# Patient Record
Sex: FEMALE | Race: OTHER | Employment: OTHER | URBAN - METROPOLITAN AREA
[De-identification: names, ages, dates, MRNs, and addresses within clinical notes are randomized per-mention and may not be internally consistent; named-entity substitution may affect disease eponyms.]

---

## 2017-02-17 RX ORDER — EZETIMIBE 10 MG/1
10 TABLET ORAL DAILY
Qty: 30 TAB | Refills: 12 | Status: SHIPPED | OUTPATIENT
Start: 2017-02-17 | End: 2017-06-08 | Stop reason: SDUPTHER

## 2017-02-21 NOTE — TELEPHONE ENCOUNTER
Patient  called and said that [employer] will no longer cover the generic of zetia , but will call the brand name of zetia please call  to [facility]  in [place] , they will have transferred to the [place] in [place] where they are at right now.       Thanks [employer]

## 2017-02-23 RX ORDER — EZETIMIBE 10 MG/1
10 TABLET ORAL DAILY
Qty: 30 TAB | Refills: 12 | Status: SHIPPED | OUTPATIENT
Start: 2017-02-23 | End: 2017-06-08 | Stop reason: SDUPTHER

## 2017-06-01 ENCOUNTER — TELEPHONE (OUTPATIENT)
Dept: CARDIOLOGY CLINIC | Age: 72
End: 2017-06-01

## 2017-06-01 DIAGNOSIS — E78.2 MIXED HYPERLIPIDEMIA: Primary | ICD-10-CM

## 2017-06-08 ENCOUNTER — OFFICE VISIT (OUTPATIENT)
Dept: CARDIOLOGY CLINIC | Age: 72
End: 2017-06-08

## 2017-06-08 VITALS
HEART RATE: 67 BPM | RESPIRATION RATE: 16 BRPM | HEIGHT: 60 IN | BODY MASS INDEX: 30.92 KG/M2 | SYSTOLIC BLOOD PRESSURE: 118 MMHG | OXYGEN SATURATION: 97 % | WEIGHT: 157.5 LBS | DIASTOLIC BLOOD PRESSURE: 68 MMHG

## 2017-06-08 DIAGNOSIS — I10 ESSENTIAL HYPERTENSION, BENIGN: ICD-10-CM

## 2017-06-08 DIAGNOSIS — G47.33 OBSTRUCTIVE SLEEP APNEA (ADULT) (PEDIATRIC): ICD-10-CM

## 2017-06-08 DIAGNOSIS — E78.2 MIXED HYPERLIPIDEMIA: ICD-10-CM

## 2017-06-08 DIAGNOSIS — F41.9 ANXIETY: ICD-10-CM

## 2017-06-08 DIAGNOSIS — I48.0 PAROXYSMAL ATRIAL FIBRILLATION (HCC): Primary | ICD-10-CM

## 2017-06-08 RX ORDER — CLINDAMYCIN PHOSPHATE 20 MG/G
CREAM VAGINAL
COMMUNITY
Start: 2017-05-10 | End: 2019-05-09 | Stop reason: ALTCHOICE

## 2017-06-08 RX ORDER — AMLODIPINE BESYLATE 5 MG/1
TABLET ORAL
Qty: 90 TAB | Refills: 3 | Status: SHIPPED | OUTPATIENT
Start: 2017-06-08 | End: 2018-03-13 | Stop reason: SDUPTHER

## 2017-06-08 RX ORDER — EZETIMIBE 10 MG/1
10 TABLET ORAL DAILY
Qty: 90 TAB | Refills: 3 | Status: SHIPPED | OUTPATIENT
Start: 2017-06-08 | End: 2018-08-16 | Stop reason: SDUPTHER

## 2017-06-08 NOTE — PROGRESS NOTES
Note by KADY Maria:    Chief Complaint   Patient presents with    Follow-up     6 month. complains of 2/10 back pain L right side with golf ball sized lump. Patient reports not trying anything for relief.   Suggested rest and ibuprofen with ice on 15 min at a time

## 2017-06-08 NOTE — PROGRESS NOTES
NAME:  Evy Goldberg   :      MRN:   [id number]   PCP:  Raghavendra Smiley MD           Subjective: The patient is a 66y.o. year old female  who returns for a routine follow-up on HTN and hyperlipidemia. Since the last visit, patient reports no change in exercise tolerance, chest pain, edema, medication intolerance, palpitations, shortness of breath, PND/orthopnea wheezing, sputum, syncope, dizziness or light headedness. Doing well. Complains of a painful cystic mass on her right low back. Also states she has anxiety and thinks about all the world problems too much. Patient Active Problem List   Diagnosis Code    Palpitations R00.2    Tick bite W57. Neha Janine Arthritis M19.90    Fibrocystic breast changes N60.19    History of emotional problems F48.9    Mixed hyperlipidemia E78.2    Essential hypertension, benign I10    Atrial fibrillation (HCC) I48.91    Obstructive sleep apnea (adult) (pediatric) G47.33    RLQ abdominal pain R10.31    Hematuria R31.9    Anemia D64.9       Patient Active Problem List   Diagnosis Code    Palpitations R00.2    Tick bite W57. Neha Janine Arthritis M19.90    Fibrocystic breast changes N60.19    History of emotional problems F48.9    Mixed hyperlipidemia E78.2    Essential hypertension, benign I10    Atrial fibrillation (HCC) I48.91    Obstructive sleep apnea (adult) (pediatric) G47.33    RLQ abdominal pain R10.31    Hematuria R31.9    Anemia D64.9       Past Medical History:   Diagnosis Date    Anxiety     Arrhythmia     palpitations    Arthritis     Diverticulosis     Fibrocystic breast changes 2011    GERD (gastroesophageal reflux disease)     Hiatal hernia     High cholesterol     Hypertension     Migraine     MARIUSZ (obstructive sleep apnea) 2009    AHI: 15 per hour    Other ill-defined conditions     VULVA-BURNING    Unspecified sleep apnea     uses c-pap       Social History   Substance Use Topics    Smoking status: Never Smoker    Smokeless tobacco: Never Used    Alcohol use No      History reviewed. No pertinent family history. Review of Systems  Constitutional: Negative for fever, chills, and diaphoresis. Respiratory: Negative for cough, hemoptysis, sputum production, shortness of breath and wheezing. Cardiovascular: Negative for chest pain, palpitations, orthopnea, claudication, leg swelling and PND. Gastrointestinal: Negative for heartburn, nausea, vomiting, blood in stool and melena. Genitourinary: Negative for dysuria and flank pain. Musculoskeletal: Negative for joint pain and back pain. Skin: Negative for rash. Neurological: Negative for focal weakness, seizures, loss of consciousness, weakness and headaches. Endo/Heme/Allergies: Does not bruise/bleed easily. Psychiatric/Behavioral: Negative for memory loss. The patient does not have insomnia. Objective:       Vitals:    06/08/17 1136 06/08/17 1151   BP: 122/70 118/68   Pulse: 67    Resp: 16    SpO2: 97%    Weight: 157 lb 8 oz (71.4 kg)    Height: 5' (1.524 m)     Body mass index is 30.76 kg/(m^2). General PE    Gen: NAD     Mental Status - Alert. General Appearance - Not in acute distress. Neck - no JVD     Chest and Lung Exam     Inspection: Accessory muscles - No use of accessory muscles in breathing. Auscultation:   Breath sounds: - Normal.     Cardiovascular   Inspection: Jugular vein - Bilateral - Inspection Normal.   Palpation/Percussion:   Apical Impulse: - Normal.   Auscultation: Rhythm - Regular. Heart Sounds - S1 WNL and S2 WNL. No S3 or S4. Murmurs & Other Heart Sounds: Auscultation of the heart reveals - No Murmurs. Peripheral Vascular   Upper Extremity: Inspection - Bilateral - No Cyanotic nailbeds or Digital clubbing. Lower Extremity:   Palpation: Edema - Bilateral - No edema. Abdomen: Soft, non-tender, bowel sounds are active.      Neuro: A&O times 3, CN and motor grossly WNL      Data Review: EKG -  Sinus  Rhythm  -First degree A-V block   Riky = 228  BORDERLINE RHYTHM      Allergies reviewed  Allergies   Allergen Reactions    Latex Itching    Adhesive Tape-Silicones Other (comments)     Causes skin to turn red    Codeine Other (comments)     abd pain      Crestor [Rosuvastatin] Itching    Dicyclomine Other (comments)     Chest pain     Lipitor [Atorvastatin] Myalgia     Joint pain      Other Medication Rash     Itch Relief Mositurizing Lotion    Pcn [Penicillins] Hives       Medications reviewed  Current Outpatient Prescriptions   Medication Sig    ezetimibe (ZETIA) 10 mg tablet Take 1 Tab by mouth daily. TAKE ONE TABLET BY MOUTH ONCE DAILY  Indications: BRAND ONLY    pitavastatin (LIVALO) 4 mg tab tablet Take 1 Tab by mouth daily.  amLODIPine (NORVASC) 5 mg tablet Take one tab daily    cpap machine kit by Does Not Apply route.  hydroCHLOROthiazide (HYDRODIURIL) 25 mg tablet TAKE ONE TABLET BY MOUTH ONCE DAILY    metoprolol succinate (TOPROL-XL) 50 mg XL tablet TAKE ONE TABLET BY MOUTH DAILY    BIOTIN PO Take 5,000 mcg by mouth two (2) times a day.  DOCOSAHEXANOIC ACID/EPA (FISH OIL PO) Take 2,000 mg by mouth daily.  FLAXSEED OIL PO Take 1,000 mg by mouth.  aspirin delayed-release (ASPIR-LOW) 81 mg tablet Take 81 mg by mouth daily.  estradiol (ESTRACE) 1 mg tablet Take 0.5 mg by mouth every other day.  clindamycin (CLEOCIN) 2 % vaginal cream     iron-vitamin C (VITRON-C) 65 mg iron- 125 mg TbEC Take 1 tablet by mouth daily.  naproxen (NAPROSYN) 500 mg tablet Take 500 mg by mouth two (2) times daily as needed. No current facility-administered medications for this visit. Assessment:       ICD-10-CM ICD-9-CM    1. Paroxysmal atrial fibrillation (HCC) I48.0 427.31    2. Essential hypertension, benign I10 401.1 AMB POC EKG ROUTINE W/ 12 LEADS, INTER & REP   3. Mixed hyperlipidemia E78.2 272.2    4.  Anxiety F41.9 300.00         Orders Placed This Encounter  AMB POC EKG ROUTINE W/ 12 LEADS, INTER & REP     Order Specific Question:   Reason for Exam:     Answer:   Routine    clindamycin (CLEOCIN) 2 % vaginal cream    ezetimibe (ZETIA) 10 mg tablet     Sig: Take 1 Tab by mouth daily. TAKE ONE TABLET BY MOUTH ONCE DAILY  Indications: BRAND ONLY     Dispense:  90 Tab     Refill:  3    pitavastatin (LIVALO) 4 mg tab tablet     Sig: Take 1 Tab by mouth daily. Dispense:  90 Tab     Refill:  3    amLODIPine (NORVASC) 5 mg tablet     Sig: Take one tab daily     Dispense:  90 Tab     Refill:  3         Plan:     Patient presents for follow up, feeling well and stable from cardiac standpoint. PAF:  Holter (7/15) with NO afib. Low threshold to anticoagulate if any clinically apparent atrial fibrillation in the future. HTN at goal.    Lipids:  on maximally tolerated statin and Zetia. Noted sebaceous cyst in right lower back. Advised pt to monitor for one week and see PCP if pain, size progresses. Anxiety:  She states she is constantly thinking about all of the world's problems. Discussed briefly to try to focus on the things that she can change. Advised to discuss with PCP as well. We will see her back in 6 mo.      Joel Go MD

## 2017-06-08 NOTE — MR AVS SNAPSHOT
Visit Information Date & Time Provider Department Dept. Phone Encounter #  
 6/8/2017 11:30 AM Rosalva Choe, [address] [facility] [phone] Your Appointments 12/13/2017  1:30 PM  
6 MONTH with Rosalva Choe MD  
[address] [facility] [address]) Appt Note: $0CP 6/8/17ksr [id number] [facility]  
[phone] [address] [facility] Upcoming Health Maintenance Date Due DTaP/Tdap/Td series (1 - Tdap) 9/17/1964 FOBT Q 1 YEAR AGE 50-75 9/17/1993 ZOSTER VACCINE AGE 60> 9/17/2003 GLAUCOMA SCREENING Q2Y 9/17/2008 OSTEOPOROSIS SCREENING (DEXA) 9/17/2008 Pneumococcal 65+ Low/Medium Risk (1 of 2 - PCV13) 9/17/2008 MEDICARE YEARLY EXAM 9/17/2008 BREAST CANCER SCRN MAMMOGRAM 6/30/2013 INFLUENZA AGE 9 TO ADULT 8/1/2017 Allergies as of 6/8/2017  Review Complete On: 6/8/2017 By: Rosalva Choe MD  
  
 Severity Noted Reaction Type Reactions Latex  07/29/2012    Itching Adhesive Tape-silicones High 36/10/0543    Other (comments) Causes skin to turn red Codeine  05/21/2011    Other (comments)  
 abd pain Crestor [Rosuvastatin]  05/21/2011    Itching Dicyclomine  04/16/2013    Other (comments) Chest pain Lipitor [Atorvastatin]  12/08/2016    Myalgia Joint pain Other Medication  09/18/2012   Topical Rash Itch Relief Mositurizing Lotion Pcn [Penicillins]  05/21/2011    Hives Current Immunizations  Never Reviewed No immunizations on file. Not reviewed this visit You Were Diagnosed With   
  
 Codes Comments Essential hypertension, benign    -  Primary ICD-10-CM: I10 
ICD-9-CM: 401.1 Mixed hyperlipidemia     ICD-10-CM: E78.2 ICD-9-CM: 272.2 Paroxysmal atrial fibrillation (HCC)     ICD-10-CM: I48.0 ICD-9-CM: 427.31 Vitals BP Pulse Resp Height(growth percentile) Weight(growth percentile) SpO2  
 118/68 (BP 1 Location: Left arm, BP Patient Position: Sitting) 67 16 5' (1.524 m) 157 lb 8 oz (71.4 kg) 97% BMI OB Status Smoking Status 30.76 kg/m2 Hysterectomy Never Smoker Vitals History BMI and BSA Data Body Mass Index Body Surface Area 30.76 kg/m 2 1.74 m 2 Preferred Pharmacy Pharmacy Name Phone [facility] [address] [place], [place] - [address] [place] [address] Rajendra Burns [phone] Your Updated Medication List  
  
   
This list is accurate as of: 6/8/17 12:39 PM.  Always use your most recent med list. amLODIPine 5 mg tablet Commonly known as:  Claudell Hesselbach Take one tab daily ASPIR-LOW 81 mg tablet Generic drug:  aspirin delayed-release Take 81 mg by mouth daily. BIOTIN PO Take 5,000 mcg by mouth two (2) times a day. clindamycin 2 % vaginal cream  
Commonly known as:  CLEOCIN  
  
 cpap machine kit  
by Does Not Apply route. estradiol 1 mg tablet Commonly known as:  ESTRACE Take 0.5 mg by mouth every other day.  
  
 ezetimibe 10 mg tablet Commonly known as:  Yuvonne Gallop Take 1 Tab by mouth daily. TAKE ONE TABLET BY MOUTH ONCE DAILY  Indications: BRAND ONLY FISH OIL PO Take 2,000 mg by mouth daily. FLAXSEED OIL PO Take 1,000 mg by mouth. hydroCHLOROthiazide 25 mg tablet Commonly known as:  HYDRODIURIL  
TAKE ONE TABLET BY MOUTH ONCE DAILY  
  
 metoprolol succinate 50 mg XL tablet Commonly known as:  TOPROL-XL  
TAKE ONE TABLET BY MOUTH DAILY  
  
 naproxen 500 mg tablet Commonly known as:  NAPROSYN Take 500 mg by mouth two (2) times daily as needed. pitavastatin 4 mg Tab tablet Commonly known as:  LIVALO Take 1 Tab by mouth daily. VITRON-C 65 mg iron- 125 mg Tbec Generic drug:  iron,carbonyl-vitamin C Take 1 tablet by mouth daily. Prescriptions Sent to Pharmacy Refills ezetimibe (ZETIA) 10 mg tablet 3 Sig: Take 1 Tab by mouth daily. TAKE ONE TABLET BY MOUTH ONCE DAILY  Indications: BRAND ONLY Class: Normal  
 Pharmacy: [address] Ph #: [phone] Route: Oral  
 pitavastatin (LIVALO) 4 mg tab tablet 3 Sig: Take 1 Tab by mouth daily. Class: Normal  
 Pharmacy: [address] Ph #: [phone] Route: Oral  
 amLODIPine (NORVASC) 5 mg tablet 3 Sig: Take one tab daily Class: Normal  
 Pharmacy: [address] Ph #: [phone] We Performed the Following AMB POC EKG ROUTINE W/ 12 LEADS, INTER & REP [87199 CPT(R)] Introducing [facility]! Dear Be Ba: Thank you for requesting a Pensqr account. Our records indicate that you already have an active Pensqr account. You can access your account anytime at [email or web address] Did you know that you can access your hospital and ER discharge instructions at any time in Pensqr? You can also review all of your test results from your hospital stay or ER visit. Additional Information If you have questions, please visit the Frequently Asked Questions section of the Pensqr website at [email or web address] Remember, Pensqr is NOT to be used for urgent needs. For medical emergencies, dial 911. Now available from your iPhone and Android! Please provide this summary of care documentation to your next provider. Your primary care clinician is listed as Negin Dias. If you have any questions after today's visit, please call [phone].

## 2017-11-29 ENCOUNTER — HOSPITAL ENCOUNTER (OUTPATIENT)
Dept: LAB | Age: 72
Discharge: HOME | End: 2017-11-29

## 2017-12-11 ENCOUNTER — HOSPITAL ENCOUNTER (OUTPATIENT)
Dept: LAB | Age: 72
Discharge: HOME | End: 2017-12-11
Payer: MEDICARE

## 2017-12-11 PROCEDURE — 36415 COLL VENOUS BLD VENIPUNCTURE: CPT

## 2017-12-11 PROCEDURE — 80053 COMPREHEN METABOLIC PANEL: CPT

## 2017-12-11 PROCEDURE — 80061 LIPID PANEL: CPT

## 2017-12-11 PROCEDURE — 82550 ASSAY OF CK (CPK): CPT

## 2017-12-12 LAB
ALBUMIN SERPL-MCNC: 4.1 G/DL (ref 3.5–4.8)
ALBUMIN/GLOB SERPL: 1.4 {RATIO} (ref 1.2–2.2)
ALP SERPL-CCNC: 72 IU/L (ref 39–117)
ALT SERPL-CCNC: 42 IU/L (ref 0–32)
AST SERPL-CCNC: 36 IU/L (ref 0–40)
BILIRUB SERPL-MCNC: 0.4 MG/DL (ref 0–1.2)
BUN SERPL-MCNC: 17 MG/DL (ref 8–27)
BUN/CREAT SERPL: 24 (ref 12–28)
CALCIUM SERPL-MCNC: 9.6 MG/DL (ref 8.7–10.3)
CHLORIDE SERPL-SCNC: 101 MMOL/L (ref 96–106)
CHOLEST SERPL-MCNC: 176 MG/DL (ref 100–199)
CK SERPL-CCNC: 96 U/L (ref 24–173)
CO2 SERPL-SCNC: 30 MMOL/L (ref 18–29)
CREAT SERPL-MCNC: 0.71 MG/DL (ref 0.57–1)
GFR SERPLBLD CREATININE-BSD FMLA CKD-EPI: 84 ML/MIN/1.73
GFR SERPLBLD CREATININE-BSD FMLA CKD-EPI: 97 ML/MIN/1.73
GLOBULIN SER CALC-MCNC: 2.9 G/DL (ref 1.5–4.5)
GLUCOSE SERPL-MCNC: 102 MG/DL (ref 65–99)
HDLC SERPL-MCNC: 49 MG/DL
INTERPRETATION, 910389: NORMAL
LDLC SERPL CALC-MCNC: 96 MG/DL (ref 0–99)
POTASSIUM SERPL-SCNC: 3.8 MMOL/L (ref 3.5–5.2)
PROT SERPL-MCNC: 7 G/DL (ref 6–8.5)
SODIUM SERPL-SCNC: 144 MMOL/L (ref 134–144)
TRIGL SERPL-MCNC: 154 MG/DL (ref 0–149)
VLDLC SERPL CALC-MCNC: 31 MG/DL (ref 5–40)

## 2017-12-13 ENCOUNTER — OFFICE VISIT (OUTPATIENT)
Dept: CARDIOLOGY CLINIC | Age: 72
End: 2017-12-13

## 2017-12-13 VITALS
SYSTOLIC BLOOD PRESSURE: 120 MMHG | HEART RATE: 75 BPM | WEIGHT: 155.9 LBS | BODY MASS INDEX: 30.61 KG/M2 | RESPIRATION RATE: 16 BRPM | HEIGHT: 60 IN | OXYGEN SATURATION: 97 % | DIASTOLIC BLOOD PRESSURE: 76 MMHG

## 2017-12-13 DIAGNOSIS — I10 ESSENTIAL HYPERTENSION, BENIGN: ICD-10-CM

## 2017-12-13 DIAGNOSIS — I48.0 PAF (PAROXYSMAL ATRIAL FIBRILLATION) (HCC): Primary | ICD-10-CM

## 2017-12-13 DIAGNOSIS — E78.2 MIXED HYPERLIPIDEMIA: ICD-10-CM

## 2017-12-13 NOTE — PROGRESS NOTES
[id number] [phone]     Subjective:      Fritz Yuan is a 74 y.o. female is here for routine f/u. The patient denies chest pain/ shortness of breath, orthopnea, PND, LE edema, syncope, or presyncope. She is concerned about poor sleep and hair loss. She has a spell of palpitations lasting a few seconds to a couple minutes once every couple weeks.     Patient Active Problem List    Diagnosis Date Noted    PAF (paroxysmal atrial fibrillation) ([facility] [address] 2017    Anemia 2016    Hematuria 2015    RLQ abdominal pain 08/15/2012    Obstructive sleep apnea (adult) (pediatric) 2012    History of emotional problems 2012    Mixed hyperlipidemia 2012    Essential hypertension, benign 2012    Atrial fibrillation ([facility] [address] 2012    Fibrocystic breast changes 2011    Arthritis     Palpitations 2011    Tick bite 2011      Jose Hartley MD  Past Medical History:   Diagnosis Date    Anxiety     Arrhythmia     palpitations    Arthritis     Diverticulosis     Fibrocystic breast changes 2011    GERD (gastroesophageal reflux disease)     Hiatal hernia     High cholesterol     Hypertension     Migraine     MARIUSZ (obstructive sleep apnea) 2009    AHI: 15 per hour    Other ill-defined conditions(799.89)     VULVA-BURNING    Unspecified sleep apnea     uses c-pap      Past Surgical History:   Procedure Laterality Date    BREAST SURGERY PROCEDURE UNLISTED      left breast lumpectomy    HX APPENDECTOMY      HX CHOLECYSTECTOMY      HX GYN       x 3, hysterectomy    HX ORTHOPAEDIC      L knee scope    HX ORTHOPAEDIC      left foot bunionectomy    HX RECTOCELE REPAIR      HX UROLOGICAL      BLADDER TACKING     Allergies   Allergen Reactions    Latex Itching    Adhesive Tape-Silicones Other (comments)     Causes skin to turn red    Codeine Other (comments)     abd pain      Crestor [Rosuvastatin] Itching    Dicyclomine Other (comments)     Chest pain     Iodine And Iodide Containing Products Rash     Caused itching and a rash    Lipitor [Atorvastatin] Myalgia     Joint pain      Other Medication Rash     Itch Relief Mositurizing Lotion    Pcn [Penicillins] Hives      No family history on file. Social History     Social History    Marital status:      Spouse name: N/A    Number of children: N/A    Years of education: N/A     Occupational History    Not on file. Social History Main Topics    Smoking status: Never Smoker    Smokeless tobacco: Never Used    Alcohol use No    Drug use: No    Sexual activity: Yes     Partners: Male     Other Topics Concern    Not on file     Social History Narrative      Current Outpatient Prescriptions   Medication Sig    ezetimibe (ZETIA) 10 mg tablet Take 1 Tab by mouth daily. TAKE ONE TABLET BY MOUTH ONCE DAILY  Indications: BRAND ONLY    pitavastatin (LIVALO) 4 mg tab tablet Take 1 Tab by mouth daily.  amLODIPine (NORVASC) 5 mg tablet Take one tab daily    cpap machine kit by Does Not Apply route.  hydroCHLOROthiazide (HYDRODIURIL) 25 mg tablet TAKE ONE TABLET BY MOUTH ONCE DAILY    metoprolol succinate (TOPROL-XL) 50 mg XL tablet TAKE ONE TABLET BY MOUTH DAILY    iron-vitamin C (VITRON-C) 65 mg iron- 125 mg TbEC Take 1 Tab by mouth two (2) times a day.  BIOTIN PO Take 5,000 mcg by mouth two (2) times a day.  DOCOSAHEXANOIC ACID/EPA (FISH OIL PO) Take 2,000 mg by mouth daily.  FLAXSEED OIL PO Take 1,000 mg by mouth.  aspirin delayed-release (ASPIR-LOW) 81 mg tablet Take 81 mg by mouth daily.  estradiol (ESTRACE) 1 mg tablet Take 1 mg by mouth daily.  naproxen (NAPROSYN) 500 mg tablet Take 500 mg by mouth two (2) times daily as needed.  clindamycin (CLEOCIN) 2 % vaginal cream      No current facility-administered medications for this visit.           Review of Symptoms:  11 systems reviewed, negative other than as stated in the HPI    Physical ExamPhysical Exam:    Vitals:    12/13/17 1341 12/13/17 1400   BP: 124/74 120/76   Pulse: 75    Resp: 16    SpO2: 97%    Weight: 155 lb 14.4 oz (70.7 kg)    Height: 5' (1.524 m)      Body mass index is 30.45 kg/(m^2). General PE   Gen:  NAD  Mental Status - Alert. General Appearance - Not in acute distress. Chest and Lung Exam   Inspection: Accessory muscles - No use of accessory muscles in breathing. Auscultation:   Breath sounds: - Normal.   Cardiovascular   Inspection: Jugular vein - Bilateral - Inspection Normal.   Palpation/Percussion:   Apical Impulse: - Normal.   Auscultation: Rhythm - Regular. Heart Sounds - S1 WNL and S2 WNL. No S3 or S4. Murmurs & Other Heart Sounds: Auscultation of the heart reveals - No Murmurs. Peripheral Vascular   Upper Extremity: Inspection - Bilateral - No Cyanotic nailbeds or Digital clubbing. Lower Extremity:   Palpation: Edema - Bilateral - No edema. Abdomen:   Soft, non-tender, bowel sounds are active.   Neuro: A&O times 3, CN and motor grossly WNL    Labs:   Lab Results   Component Value Date/Time    Cholesterol, total 176 12/11/2017 11:43 AM    Cholesterol, total 210 12/06/2016 01:05 PM    Cholesterol, total 167 05/16/2016 12:07 PM    Cholesterol, total 165 06/25/2015 12:27 PM    Cholesterol, total 245 12/18/2014 01:43 PM    HDL Cholesterol 49 12/11/2017 11:43 AM    HDL Cholesterol 62 12/06/2016 01:05 PM    HDL Cholesterol 54 05/16/2016 12:07 PM    HDL Cholesterol 54 06/25/2015 12:27 PM    HDL Cholesterol 80 12/18/2014 01:43 PM    LDL, calculated 96 12/11/2017 11:43 AM    LDL, calculated 114 12/06/2016 01:05 PM    LDL, calculated 72 05/16/2016 12:07 PM    LDL, calculated 70 06/25/2015 12:27 PM    LDL, calculated 144 12/18/2014 01:43 PM    Triglyceride 154 12/11/2017 11:43 AM    Triglyceride 171 12/06/2016 01:05 PM    Triglyceride 206 05/16/2016 12:07 PM    Triglyceride 207 06/25/2015 12:27 PM Triglyceride 107 12/18/2014 01:43 PM     Lab Results   Component Value Date/Time     05/21/2011 01:00 PM     Lab Results   Component Value Date/Time    Sodium 144 12/11/2017 11:43 AM    Potassium 3.8 12/11/2017 11:43 AM    Chloride 101 12/11/2017 11:43 AM    CO2 30 12/11/2017 11:43 AM    Anion gap 7 09/17/2014 02:19 PM    Glucose 102 12/11/2017 11:43 AM    BUN 17 12/11/2017 11:43 AM    Creatinine 0.71 12/11/2017 11:43 AM    BUN/Creatinine ratio 24 12/11/2017 11:43 AM    GFR est AA 97 12/11/2017 11:43 AM    GFR est non-AA 84 12/11/2017 11:43 AM    Calcium 9.6 12/11/2017 11:43 AM    Bilirubin, total 0.4 12/11/2017 11:43 AM    AST (SGOT) 36 12/11/2017 11:43 AM    Alk. phosphatase 72 12/11/2017 11:43 AM    Protein, total 7.0 12/11/2017 11:43 AM    Albumin 4.1 12/11/2017 11:43 AM    Globulin 4.3 07/07/2013 01:40 PM    A-G Ratio 1.4 12/11/2017 11:43 AM    ALT (SGPT) 42 12/11/2017 11:43 AM       EKG:  NSR     Assessment:        1. PAF (paroxysmal atrial fibrillation) ([facility] [address]    2. Essential hypertension, benign    3. Mixed hyperlipidemia        Orders Placed This Encounter    AMB POC EKG ROUTINE W/ 12 LEADS, INTER & REP     Order Specific Question:   Reason for Exam:     Answer:   routine        Plan:     Patient presents for follow up, feeling well and stable from cardiac standpoint.      PAF:  Holter (7/15) with NO afib. Low threshold to anticoagulate if any clinically apparent atrial fibrillation in the future. Check 2 week event monitor due to occasional palpitations lasting seconds to minutes. These occur about once or twice a week. She states she is moving to [place] soon. Advised to consider having this done with a cardiologist in [place]. Otherwise we can do it when she comes back in April.     HTN at goal.     Lipids:  on maximally tolerated statin and Zetia. LDL in the 90s.    Anxiety:  She states she is constantly thinking about all of the world's problems.   Discussed briefly to try to focus on the things that she can change. Advised to discuss with PCP as well.       Hair loss and disturbed sleep:  She states she has already seen dermatology who said that hair loss is due to age. She has sleep apnea on CPAP, advised to discuss anxiety and sleep with PCP     Follow up in 6 months if testing normal and no progressive symptoms. Sooner PRN abnormal event monitor.     Ova Kayser, MD

## 2017-12-13 NOTE — PROGRESS NOTES
1. Have you been to the ER, urgent care clinic since your last visit? Hospitalized since your last visit? Out patient for back surgery and mammogram.      2. Have you seen or consulted any other health care providers outside of the [address] [place] [address] since your last visit? Include any pap smears or colon screening.        No.      Chief Complaint   Patient presents with    Other     6 month- heart palps

## 2017-12-13 NOTE — MR AVS SNAPSHOT
Visit Information Date & Time Provider Department Dept. Phone Encounter #  
 12/13/2017  1:30 PM Yesenia Hannah, [facility] [phone] [id number] Your Appointments 12/26/2017 10:00 AM  
Any with Patricia Jefferson MD  
[address] [place] ([facility] Appt Note: Yearly PAP f/up bring machine [address] [address]  
  
   
 [address] [place] [id number] Upcoming Health Maintenance Date Due DTaP/Tdap/Td series (1 - Tdap) 9/17/1964 FOBT Q 1 YEAR AGE 50-75 9/17/1993 ZOSTER VACCINE AGE 60> 7/17/2003 GLAUCOMA SCREENING Q2Y 9/17/2008 OSTEOPOROSIS SCREENING (DEXA) 9/17/2008 Pneumococcal 65+ Low/Medium Risk (1 of 2 - PCV13) 9/17/2008 MEDICARE YEARLY EXAM 9/17/2008 Influenza Age 5 to Adult 8/1/2017 Allergies as of 12/13/2017  Review Complete On: 12/13/2017 By: Yesenia Hannah MD  
  
 Severity Noted Reaction Type Reactions Latex  07/29/2012    Itching Adhesive Tape-silicones High 18/88/5558    Other (comments) Causes skin to turn red Codeine  05/21/2011    Other (comments)  
 abd pain Crestor [Rosuvastatin]  05/21/2011    Itching Dicyclomine  04/16/2013    Other (comments) Chest pain Iodine And Iodide Containing Products  12/13/2017    Rash Caused itching and a rash Lipitor [Atorvastatin]  12/08/2016    Myalgia Joint pain Other Medication  09/18/2012   Topical Rash Itch Relief Mositurizing Lotion Pcn [Penicillins]  05/21/2011    Hives Current Immunizations  Never Reviewed No immunizations on file. Not reviewed this visit You Were Diagnosed With   
  
 Codes Comments PAF (paroxysmal atrial fibrillation) ([facility]    -  Primary ICD-10-CM: I48.0 ICD-9-CM: 427.31 Essential hypertension, benign     ICD-10-CM: I10 
ICD-9-CM: 401.1 Mixed hyperlipidemia     ICD-10-CM: E78.2 ICD-9-CM: 272.2 Vitals BP Pulse Resp Height(growth percentile) Weight(growth percentile) SpO2  
 120/76 (BP 1 Location: Left arm, BP Patient Position: Sitting) 75 16 5' (1.524 m) 155 lb 14.4 oz (70.7 kg) 97% BMI OB Status Smoking Status 30.45 kg/m2 Hysterectomy Never Smoker Vitals History BMI and BSA Data Body Mass Index Body Surface Area  
 30.45 kg/m 2 1.73 m 2 Preferred Pharmacy Pharmacy Name Phone [facility] [address] [place], [place] - [address] [place] [address] Scotty Humphrey [phone] Your Updated Medication List  
  
   
This list is accurate as of: 12/13/17  2:27 PM.  Always use your most recent med list. amLODIPine 5 mg tablet Commonly known as:  Bacilio Chafe Take one tab daily ASPIR-LOW 81 mg tablet Generic drug:  aspirin delayed-release Take 81 mg by mouth daily. BIOTIN PO Take 5,000 mcg by mouth two (2) times a day. clindamycin 2 % vaginal cream  
Commonly known as:  CLEOCIN  
  
 cpap machine kit  
by Does Not Apply route. estradiol 1 mg tablet Commonly known as:  ESTRACE Take 1 mg by mouth daily. ezetimibe 10 mg tablet Commonly known as:  Alexandria Fleet Take 1 Tab by mouth daily. TAKE ONE TABLET BY MOUTH ONCE DAILY  Indications: BRAND ONLY FISH OIL PO Take 2,000 mg by mouth daily. FLAXSEED OIL PO Take 1,000 mg by mouth. hydroCHLOROthiazide 25 mg tablet Commonly known as:  HYDRODIURIL  
TAKE ONE TABLET BY MOUTH ONCE DAILY  
  
 metoprolol succinate 50 mg XL tablet Commonly known as:  TOPROL-XL  
TAKE ONE TABLET BY MOUTH DAILY  
  
 naproxen 500 mg tablet Commonly known as:  NAPROSYN Take 500 mg by mouth two (2) times daily as needed. pitavastatin calcium 4 mg Tab tablet Commonly known as:  LIVALO Take 1 Tab by mouth daily. VITRON-C 65 mg iron- 125 mg Tbec Generic drug:  iron,carbonyl-vitamin C Take 1 Tab by mouth two (2) times a day. We Performed the Following AMB POC EKG ROUTINE W/ 12 LEADS, INTER & REP [56120 CPT(R)] Introducing [facility] SERVICES! Dear Keysha Mendez: Thank you for requesting a [employer] account. Our records indicate that you already have an active [employer] account. You can access your account anytime at [email or web address] Did you know that you can access your hospital and ER discharge instructions at any time in [employer]? You can also review all of your test results from your hospital stay or ER visit. Additional Information If you have questions, please visit the Frequently Asked Questions section of the [employer] website at [email or web address] Remember, [employer] is NOT to be used for urgent needs. For medical emergencies, dial 911. Now available from your iPhone and Android! Please provide this summary of care documentation to your next provider. Your primary care clinician is listed as Jodeane Spatz. If you have any questions after today's visit, please call [phone].

## 2017-12-26 ENCOUNTER — OFFICE VISIT (OUTPATIENT)
Dept: SLEEP MEDICINE | Age: 72
End: 2017-12-26

## 2017-12-26 VITALS
OXYGEN SATURATION: 94 % | TEMPERATURE: 98 F | HEIGHT: 60 IN | HEART RATE: 67 BPM | WEIGHT: 152 LBS | BODY MASS INDEX: 29.84 KG/M2 | DIASTOLIC BLOOD PRESSURE: 77 MMHG | SYSTOLIC BLOOD PRESSURE: 152 MMHG

## 2017-12-26 DIAGNOSIS — I10 ESSENTIAL HYPERTENSION, BENIGN: ICD-10-CM

## 2017-12-26 DIAGNOSIS — G47.33 OSA (OBSTRUCTIVE SLEEP APNEA): Primary | ICD-10-CM

## 2017-12-26 NOTE — PATIENT INSTRUCTIONS
217 [facility] [place]. [place], [address]  Tel.  [phone]  Fax. 100 [facility] [place] 60  [place], [address] [facility]  Tel.  [phone]  Fax. [phone] [address] [facility] [address]  Tel.  [phone]  Fax. [phone]     Learning About CPAP for Sleep Apnea  What is CPAP? CPAP is a small machine that you use at home every night while you sleep. It increases air pressure in your throat to keep your airway open. When you have sleep apnea, this can help you sleep better so you feel much better. CPAP stands for \"continuous positive airway pressure. \"  The CPAP machine will have one of the following:  · A mask that covers your nose and mouth  · Prongs that fit into your nose  · A mask that covers your nose only, the most common type. This type is called NCPAP. The N stands for \"nasal.\"  Why is it done? CPAP is usually the best treatment for obstructive sleep apnea. It is the first treatment choice and the most widely used. Your doctor may suggest CPAP if you have:  · Moderate to severe sleep apnea. · Sleep apnea and coronary artery disease (CAD) or heart failure. How does it help? · CPAP can help you have more normal sleep, so you feel less sleepy and more alert during the daytime. · CPAP may help keep heart failure or other heart problems from getting worse. · NCPAP may help lower your blood pressure. · If you use CPAP, your bed partner may also sleep better because you are not snoring or restless. What are the side effects? Some people who use CPAP have:  · A dry or stuffy nose and a sore throat. · Irritated skin on the face. · Sore eyes. · Bloating. If you have any of these problems, work with your doctor to fix them. Here are some things you can try:  · Be sure the mask or nasal prongs fit well. · See if your doctor can adjust the pressure of your CPAP. · If your nose is dry, try a humidifier.   · If your nose is runny or stuffy, try decongestant medicine or a steroid nasal spray. If these things do not help, you might try a different type of machine. Some machines have air pressure that adjusts on its own. Others have air pressures that are different when you breathe in than when you breathe out. This may reduce discomfort caused by too much pressure in your nose. Where can you learn more? Go to Knowledgestreem.be  Enter PixSense in the search box to learn more about \"Learning About CPAP for Sleep Apnea. \"   © 3329-2899 Healthwise, Tranz[employer]. Care instructions adapted under license by James Elizondo (which disclaims liability or warranty for this information). This care instruction is for use with your licensed healthcare professional. If you have questions about a medical condition or this instruction, always ask your healthcare professional. [address] any warranty or liability for your use of this information. Content Version: 4.3.75619; Last Revised: January 11, 2010  PROPER SLEEP HYGIENE    What to avoid  · Do not have drinks with caffeine, such as coffee or black tea, for 8 hours before bed. · Do not smoke or use other types of tobacco near bedtime. Nicotine is a stimulant and can keep you awake. · Avoid drinking alcohol late in the evening, because it can cause you to wake in the middle of the night. · Do not eat a big meal close to bedtime. If you are hungry, eat a light snack. · Do not drink a lot of water close to bedtime, because the need to urinate may wake you up during the night. · Do not read or watch TV in bed. Use the bed only for sleeping and sexual activity. What to try  · Go to bed at the same time every night, and wake up at the same time every morning. Do not take naps during the day. · Keep your bedroom quiet, dark, and cool. · Get regular exercise, but not within 3 to 4 hours of your bedtime. .  · Sleep on a comfortable pillow and mattress.   · If watching the clock makes you anxious, turn it facing away from you so you cannot see the time. · If you worry when you lie down, start a worry book. Well before bedtime, write down your worries, and then set the book and your concerns aside. · Try meditation or other relaxation techniques before you go to bed. · If you cannot fall asleep, get up and go to another room until you feel sleepy. Do something relaxing. Repeat your bedtime routine before you go to bed again. · Make your house quiet and calm about an hour before bedtime. Turn down the lights, turn off the TV, log off the computer, and turn down the volume on music. This can help you relax after a busy day. Drowsy Driving: The [facility] 54 cites drowsiness as a causing factor in more than 001,223 police reported crashes annually, resulting in 76,000 injuries and 1,500 deaths. Other surveys suggest 55% of people polled have driven while drowsy in the past year, 23% had fallen asleep but not crashed, 3% crashed, and 2% had and accident due to drowsy driving. Who is at risk? Young Drivers: One study of drowsy driving accidents states that 55% of the drivers were under 25 years. Of those, 75% were male. Shift Workers and Travelers: People who work overnight or travel across time zones frequently are at higher risk of experiencing Circadian Rhythm Disorders. They are trying to work and function when their body is programed to sleep. Sleep Deprived: Lack of sleep has a serious impact on your ability to pay attention or focus on a task. Consistently getting less than the average of 8 hours your body needs creates partial or cumulative sleep deprivation. Untreated Sleep Disorders: Sleep Apnea, Narcolepsy, R.L.S., and other sleep disorders (untreated) prevent a person from getting enough restful sleep. This leads to excessive daytime sleepiness and increases the risk for drowsy driving accidents by up to 7 times.   Medications / Alcohol: Even over the counter medications can cause drowsiness. Medications that impair a drivers attention should have a warning label. Alcohol naturally makes you sleepy and on its own can cause accidents. Combined with excessive drowsiness its effects are amplified. Signs of Drowsy Driving:   * You don't remember driving the last few miles   * You may drift out of your arnold   * You are unable to focus and your thoughts wander   * You may yawn more often than normal   * You have difficulty keeping your eyes open / nodding off   * Missing traffic signs, speeding, or tailgating  Prevention-   Good sleep hygiene, lifestyle and behavioral choices have the most impact on drowsy driving. There is no substitute for sleep and the average person requires 8 hours nightly. If you find yourself driving drowsy, stop and sleep. Consider the sleep hygiene tips provided during your visit as well. Medication Refill Policy: Refills for all medications require 1 week advance notice. Please have your pharmacy fax a refill request. We are unable to fax, or call in \"controled substance\" medications and you will need to pick these prescriptions up from our office. [employer] Activation    Thank you for requesting access to [employer]. Please follow the instructions below to securely access and download your online medical record. [employer] allows you to send messages to your doctor, view your test results, renew your prescriptions, schedule appointments, and more. How Do I Sign Up? 1. In your internet browser, go to [email or web address]. 2. Click on the First Time User? Click Here link in the Sign In box. You will see the New Member Sign Up page. 3. Enter your [employer] Access Code exactly as it appears below. You will not need to use this code after youve completed the sign-up process. If you do not sign up before the expiration date, you must request a new code. [employer] Access Code:  Activation code not generated  Current [employer] Status: Active (This is the date your [employer] access code will )    4. Enter the last four digits of your Social Security Number (xxxx) and Date of Birth (mm/dd/yyyy) as indicated and click Submit. You will be taken to the next sign-up page. 5. Create a Rec[employer]t ID. This will be your [employer] login ID and cannot be changed, so think of one that is secure and easy to remember. 6. Create a [employer] password. You can change your password at any time. 7. Enter your Password Reset Question and Answer. This can be used at a later time if you forget your password. 8. Enter your e-mail address. You will receive e-mail notification when new information is available in [address] 9. Click Sign Up. You can now view and download portions of your medical record. 10. Click the Download Summary menu link to download a portable copy of your medical information. Additional Information    If you have questions, please call [phone]. Remember, [employer] is NOT to be used for urgent needs. For medical emergencies, dial 911.

## 2017-12-26 NOTE — PROGRESS NOTES
Patient describes nasal dryness and nose bleeds. She doesn't remember how to adjust humidity. Patient is instructed on how to adjust hose temperature and humidity. She is able to demonstrate understanding and will contact the sleep center if she has questions or concerns.

## 2017-12-26 NOTE — PROGRESS NOTES
217 [facility]. [place], [facility]  Tel.  [phone]  Fax. 100 [facility] 60  [place], [address] [facility]  Tel.  [phone]  Fax. [phone] [address]   Tel.  [phone]  Fax. [phone]     S>Ada FRANKLIN Torin Gil is a 74 y.o. female seen for a positive airway pressure follow-up. She reports no problems using the device. She is 90% compliant over the past 30 days. The following problems are identified:    Drowsiness no Problems exhaling no   Snoring no Forget to put on no   Mask Comfortable no Can't fall asleep no   Dry Mouth no Mask falls off no   Air Leaking no Frequent awakenings no         She admits that her sleep has improved. She has been evaluated by Dr. Saba Nava but opted not to get the [employer] due to cost - she does agree to visit another provider when she returns from [place] this spring. Allergies   Allergen Reactions    Latex Itching    Adhesive Tape-Silicones Other (comments)     Causes skin to turn red    Codeine Other (comments)     abd pain      Crestor [Rosuvastatin] Itching    Dicyclomine Other (comments)     Chest pain     Iodine And Iodide Containing Products Rash     Caused itching and a rash    Lipitor [Atorvastatin] Myalgia     Joint pain      Other Medication Rash     Itch Relief Mositurizing Lotion    Pcn [Penicillins] Hives       She has a current medication list which includes the following prescription(s): clindamycin, ezetimibe, pitavastatin calcium, amlodipine, cpap machine, hydrochlorothiazide, metoprolol succinate, iron,carbonyl-vitamin c, biotin, docosahexanoic acid/epa, flaxseed oil, aspirin delayed-release, estradiol, and naproxen. Mabeline Sink She  has a past medical history of Anxiety; Arrhythmia; Arthritis; Diverticulosis; Fibrocystic breast changes (7/11/2011); GERD (gastroesophageal reflux disease); Hiatal hernia;  High cholesterol; Hypertension; Migraine; MARIUSZ (obstructive sleep apnea) (04-); Other ill-defined conditions(799.89); and Unspecified sleep apnea. [place] Sleepiness Score: 2   and Modified F.O.S.Q. Score Total / 2: 20   which reflect improved sleep quality over therapy time. O>    Visit Vitals    /77    Pulse 67    Temp 98 °F (36.7 °C) (Oral)    Ht 5' (1.524 m)    Wt 152 lb (68.9 kg)    SpO2 94%    BMI 29.69 kg/m2         General:   Not in acute distress   Eyes:  Anicteric sclerae, no obvious strabismus   Nose:  No obvious nasal septum deviation    Oropharynx:   Class 4 oropharyngeal outlet, thick tongue base, uvula not seen due to low-lying soft palate, narrow tonsilo-pharyngeal pilars   Tonsils:   tonsils are not visualized due to low-lying soft palate   Neck:   midline trachea   Chest/Lungs:  Equal lung expansion, clear on auscultation    CVS:  Normal rate, regular rhythm; no JVD   Skin:  Warm to touch; no obvious rashes   Neuro:  No focal deficits ; no obvious tremor    Psych:  Normal affect,  normal countenance;           A>    ICD-10-CM ICD-9-CM    1. MARIUSZ (obstructive sleep apnea) G47.33 327.23 AMB SUPPLY ORDER   2. Essential hypertension, benign I10 401.1    3. BMI 29.0-29.9,adult Z68.29 V85.25      AHI = 15. On CPAP : 6 cmH2O. Compliant:      yes    Therapeutic Response:  Positive    P>    * We have recommended a dedicated weight loss through appropriate diet and an exercise regiment as significant weight reduction has been shown to reduce severity of obstructive sleep apnea. * Follow-up Disposition:  Return in about 1 year (around 12/26/2018), or if symptoms worsen or fail to improve. * She was asked to contact our office for any problems regarding PAP therapy. * Counseling was provided regarding the importance of regular PAP use and on proper sleep hygiene and safe driving. * Re-enforced proper and regular cleaning for the device. Thank you for allowing us to participate in your patient's medical care.       Jay Dutta MD, JACQUI  Electronically signed.  12/26/17

## 2017-12-27 ENCOUNTER — DOCUMENTATION ONLY (OUTPATIENT)
Dept: SLEEP MEDICINE | Age: 72
End: 2017-12-27

## 2018-02-05 ENCOUNTER — TELEPHONE (OUTPATIENT)
Dept: CARDIOLOGY CLINIC | Age: 73
End: 2018-02-05

## 2018-02-05 NOTE — TELEPHONE ENCOUNTER
Pt called and advised she is completley out of her blood pressure medicine (Metoprolol). She just realized there were no refills. She's in [place]. She's wanting us to fax a refill request over the the [facility] pharmacy. [address]  Fax [phone]. Please call pt to advise.      Thanks  María Marinelli

## 2018-02-06 RX ORDER — METOPROLOL SUCCINATE 50 MG/1
TABLET, EXTENDED RELEASE ORAL
Qty: 90 TAB | Refills: 1 | Status: SHIPPED | OUTPATIENT
Start: 2018-02-06 | End: 2018-12-04 | Stop reason: SDUPTHER

## 2018-02-06 RX ORDER — METOPROLOL SUCCINATE 50 MG/1
TABLET, EXTENDED RELEASE ORAL
Qty: 90 TAB | Refills: 0 | Status: SHIPPED | OUTPATIENT
Start: 2018-02-06 | End: 2018-02-06 | Stop reason: SDUPTHER

## 2018-02-06 NOTE — TELEPHONE ENCOUNTER
called and advised she is vanley out of her blood pressure medicine (Metoprolol). She just realized there were no refills. She's in [place].  She's wanting us to fax a refill request over the the [facility]  Fax [phone].     Please call pt to advise.     Last visit 12/13/17

## 2018-03-13 RX ORDER — HYDROCHLOROTHIAZIDE 25 MG/1
TABLET ORAL
Qty: 90 TAB | Refills: 3 | Status: SHIPPED | OUTPATIENT
Start: 2018-03-13 | End: 2019-01-04 | Stop reason: SDUPTHER

## 2018-03-13 RX ORDER — AMLODIPINE BESYLATE 5 MG/1
TABLET ORAL
Qty: 90 TAB | Refills: 3 | Status: SHIPPED | OUTPATIENT
Start: 2018-03-13 | End: 2019-01-04 | Stop reason: SDUPTHER

## 2018-04-03 RX ORDER — EZETIMIBE 10 MG
TABLET ORAL
Qty: 30 TAB | Refills: 12 | Status: SHIPPED | OUTPATIENT
Start: 2018-04-03 | End: 2018-04-20 | Stop reason: SDUPTHER

## 2018-04-20 ENCOUNTER — OFFICE VISIT (OUTPATIENT)
Dept: CARDIOLOGY CLINIC | Age: 73
End: 2018-04-20

## 2018-04-20 VITALS
BODY MASS INDEX: 31.77 KG/M2 | DIASTOLIC BLOOD PRESSURE: 80 MMHG | HEART RATE: 80 BPM | WEIGHT: 161.8 LBS | HEIGHT: 60 IN | OXYGEN SATURATION: 98 % | SYSTOLIC BLOOD PRESSURE: 128 MMHG | RESPIRATION RATE: 18 BRPM

## 2018-04-20 DIAGNOSIS — E78.2 MIXED HYPERLIPIDEMIA: ICD-10-CM

## 2018-04-20 DIAGNOSIS — I49.9 IRREGULAR HEARTBEAT: ICD-10-CM

## 2018-04-20 DIAGNOSIS — I10 ESSENTIAL HYPERTENSION, BENIGN: ICD-10-CM

## 2018-04-20 DIAGNOSIS — I48.0 PAF (PAROXYSMAL ATRIAL FIBRILLATION) (HCC): Primary | ICD-10-CM

## 2018-04-20 NOTE — PROGRESS NOTES
Bassem Herbert DNP, ANP-BC  Subjective/HPI:     Eva Vargas is a 74 y.o. female is here for routine f/u. The patient denies exertional chest pain/ shortness of breath, orthopnea, PND, LE edema, palpitations, syncope, presyncope or fatigue. While in [place] in February patient reports she developed pain in her chest radiating through to her back, was evaluated in the emergency room and determine it was musculoskeletal in nature. Since then she has denied any other cardiac related symptoms. She endorses that she walks 5 miles a day without difficulty as well as remains extremely active cleaning her home and her usual activities of daily life. She denies persistent fluttering palpitations, however admits an occasional palpitation from time to time. History of statin intolerance however tolerating Livalo without malaise or arthralgias. Recently had nocturnal SOB and noted high BP.         PCP Provider  Julio Cesar Austin MD  Past Medical History:   Diagnosis Date    Anxiety     Arrhythmia     palpitations    Arthritis     Diverticulosis     Fibrocystic breast changes 2011    GERD (gastroesophageal reflux disease)     Hiatal hernia     High cholesterol     Hypertension     Migraine     MARIUSZ (obstructive sleep apnea) 2009    AHI: 15 per hour    Other ill-defined conditions(799.89)     VULVA-BURNING    Unspecified sleep apnea     uses c-pap      Past Surgical History:   Procedure Laterality Date    BREAST SURGERY PROCEDURE UNLISTED      left breast lumpectomy    HX APPENDECTOMY      HX CHOLECYSTECTOMY      HX GYN       x 3, hysterectomy    HX ORTHOPAEDIC      L knee scope    HX ORTHOPAEDIC      left foot bunionectomy    HX RECTOCELE REPAIR      HX UROLOGICAL      BLADDER TACKING     Allergies   Allergen Reactions    Latex Itching    Adhesive Tape-Silicones Other (comments)     Causes skin to turn red    Codeine Other (comments)     abd pain      Crestor [Rosuvastatin] Itching    Dicyclomine Other (comments)     Chest pain     Iodine And Iodide Containing Products Rash     Caused itching and a rash    Lipitor [Atorvastatin] Myalgia     Joint pain      Other Medication Rash     Itch Relief Mositurizing Lotion    Pcn [Penicillins] Hives      History reviewed. No pertinent family history. Current Outpatient Prescriptions   Medication Sig    hydroCHLOROthiazide (HYDRODIURIL) 25 mg tablet TAKE ONE TABLET BY MOUTH ONCE DAILY    amLODIPine (NORVASC) 5 mg tablet TAKE ONE TABLET BY MOUTH ONCE DAILY    metoprolol succinate (TOPROL-XL) 50 mg XL tablet TAKE ONE TABLET BY MOUTH DAILY    ezetimibe (ZETIA) 10 mg tablet Take 1 Tab by mouth daily. TAKE ONE TABLET BY MOUTH ONCE DAILY  Indications: BRAND ONLY    pitavastatin (LIVALO) 4 mg tab tablet Take 1 Tab by mouth daily.  cpap machine kit by Does Not Apply route.  BIOTIN PO Take 5,000 mcg by mouth daily.  FLAXSEED OIL PO Take 1,000 mg by mouth daily.  aspirin delayed-release (ASPIR-LOW) 81 mg tablet Take 81 mg by mouth daily.  estradiol (ESTRACE) 1 mg tablet Take 1 mg by mouth daily.  naproxen (NAPROSYN) 500 mg tablet Take 500 mg by mouth two (2) times daily as needed.  clindamycin (CLEOCIN) 2 % vaginal cream     iron-vitamin C (VITRON-C) 65 mg iron- 125 mg TbEC Take 1 Tab by mouth two (2) times a day.  DOCOSAHEXANOIC ACID/EPA (FISH OIL PO) Take 2,000 mg by mouth daily. No current facility-administered medications for this visit. Vitals:    04/20/18 0943 04/20/18 0952   BP: 120/70 128/80   Pulse: 80    Resp: 18    SpO2: 98%    Weight: 161 lb 12.8 oz (73.4 kg)    Height: 5' (1.524 m)      Social History     Social History    Marital status:      Spouse name: N/A    Number of children: N/A    Years of education: N/A     Occupational History    Not on file.      Social History Main Topics    Smoking status: Never Smoker    Smokeless tobacco: Never Used    Alcohol use No    Drug use: No    Sexual activity: Yes     Partners: Male     Other Topics Concern    Not on file     Social History Narrative       I have reviewed the nurses notes, vitals, problem list, allergy list, medical history, family, social history and medications. Review of Symptoms:    General: Pt denies excessive weight gain or loss. Pt is able to conduct ADL's  HEENT: Denies blurred vision, headaches, epistaxis and difficulty swallowing. Respiratory: Denies shortness of breath, WILLIS, wheezing or stridor. Cardiovascular: Denies precordial pain, palpitations, edema or PND  Gastrointestinal: Denies poor appetite, indigestion, abdominal pain or blood in stool  Musculoskeletal: Denies pain or swelling from muscles or joints  Neurologic: Denies tremor, paresthesias, or sensory motor disturbance  Skin: Denies rash, itching or texture change. Physical Exam:      General: Well developed, in no acute distress, cooperative and alert  HEENT: No carotid bruits, no JVD, trach is midline. Neck Supple, PEERL, EOM intact. Heart:  Normal S1/S2 negative S3 or S4. Regular, no murmur, gallop or rub.   Respiratory: Clear bilaterally x 4, no wheezing or rales  Abdomen:   Soft, non-tender, no masses, bowel sounds are active.   Extremities:  No edema, normal cap refill, no cyanosis, atraumatic. Neuro: A&Ox3, speech clear, gait stable. Skin: Skin color is normal. No rashes or lesions.  Non diaphoretic  Vascular: 2+ pulses symmetric in all extremities    Cardiographics    ECG: Normal sinus rhythm  Results for orders placed or performed in visit on 07/13/15   CARDIAC HOLTER MONITOR, 24 HOURS    Narrative    ECG Monitor/24 hours, Complete    Reason for Holter Monitor   A-fib    Heartbeat    Slowest 57  Average 69  Fastest  89    Results:   Underlying Rhythm: Normal sinus rhythm      Atrial Arrhythmias: none            AV Conduction: normal    Ventricular Arrhythmias: premature ventricular contractions;  occasional     ST Segment Analysis:non-specific changes     Symptom Correlation:  No symptoms reported. Comments:  No evidence of atrial fibrillation. Radha Grant MD         Results for orders placed or performed during the hospital encounter of 08/31/12   EKG, 12 LEAD, INITIAL   Result Value Ref Range    Ventricular Rate 57 BPM    Atrial Rate 57 BPM    P-R Interval 190 ms    QRS Duration 86 ms    Q-T Interval 412 ms    QTC Calculation (Bezet) 401 ms    Calculated P Axis 25 degrees    Calculated R Axis 38 degrees    Calculated T Axis 44 degrees    Diagnosis       Sinus bradycardia  When compared with ECG of 21-MAY-2011 12:31,  No significant change was found  Confirmed by Madhu Tom M.D., Ruel Perez ([id number]) on 8/31/2012 5:22:59 PM         Cardiology Labs:  Lab Results   Component Value Date/Time    Cholesterol, total 176 12/11/2017 11:43 AM    HDL Cholesterol 49 12/11/2017 11:43 AM    LDL, calculated 96 12/11/2017 11:43 AM    Triglyceride 154 (H) 12/11/2017 11:43 AM       Lab Results   Component Value Date/Time    Sodium 144 12/11/2017 11:43 AM    Potassium 3.8 12/11/2017 11:43 AM    Chloride 101 12/11/2017 11:43 AM    CO2 30 (H) 12/11/2017 11:43 AM    Anion gap 7 09/17/2014 02:19 PM    Glucose 102 (H) 12/11/2017 11:43 AM    BUN 17 12/11/2017 11:43 AM    Creatinine 0.71 12/11/2017 11:43 AM    BUN/Creatinine ratio 24 12/11/2017 11:43 AM    GFR est AA 97 12/11/2017 11:43 AM    GFR est non-AA 84 12/11/2017 11:43 AM    Calcium 9.6 12/11/2017 11:43 AM    Bilirubin, total 0.4 12/11/2017 11:43 AM    AST (SGOT) 36 12/11/2017 11:43 AM    Alk. phosphatase 72 12/11/2017 11:43 AM    Protein, total 7.0 12/11/2017 11:43 AM    Albumin 4.1 12/11/2017 11:43 AM    Globulin 4.3 (H) 07/07/2013 01:40 PM    A-G Ratio 1.4 12/11/2017 11:43 AM    ALT (SGPT) 42 (H) 12/11/2017 11:43 AM           Assessment:     Assessment:     Diagnoses and all orders for this visit:    1.  PAF (paroxysmal atrial fibrillation) ([facility] [address]  -     LOOP MONITOR, Clinic Performed; Future    2. Irregular heartbeat  -     AMB POC EKG ROUTINE W/ 12 LEADS, INTER & REP  -     LOOP MONITOR, Clinic Performed; Future    3. Essential hypertension, benign  -     LOOP MONITOR, Clinic Performed; Future    4. Mixed hyperlipidemia  -     LOOP MONITOR, Clinic Performed; Future        ICD-10-CM ICD-9-CM    1. PAF (paroxysmal atrial fibrillation) (HCC) I48.0 427.31 LOOP MONITOR   2. Irregular heartbeat I49.9 427.9 AMB POC EKG ROUTINE W/ 12 LEADS, INTER & REP      LOOP MONITOR   3. Essential hypertension, benign I10 401.1 LOOP MONITOR   4. Mixed hyperlipidemia E78.2 272.2 LOOP MONITOR     Orders Placed This Encounter    LOOP MONITOR, Clinic Performed     Standing Status:   Future     Standing Expiration Date:   10/20/2018     Order Specific Question:   Reason for Exam:     Answer:   palpitations    AMB POC EKG ROUTINE W/ 12 LEADS, INTER & REP     Order Specific Question:   Reason for Exam:     Answer:   routine        Plan:     PAF:  Holter (7/15) with NO afib.  Low threshold to anticoagulate if any clinically apparent atrial fibrillation in the future. Patient is now back in a [facility], will arrange for 2 week event monitor.      HTN at goal.      Lipids:  on maximally tolerated statin and Zetia. LDL in the 90s. LDL 96 12/17    Hypertension: Controlled continue current medications. Follow-up in 12 months if no AF. Renuka Eldridge MD    This note was created using voice recognition software. Despite editing, there may be syntax errors.

## 2018-04-20 NOTE — PROGRESS NOTES
1. Have you been to the ER, urgent care clinic since your last visit? Hospitalized since your last visit? ER in [place] for CP in Feb 2018    2. Have you seen or consulted any other health care providers outside of the [facility] since your last visit? Include any pap smears or colon screening. No    Chief Complaint   Patient presents with    Irregular Heart Beat     4 mo appt. Denied cardiac symptoms.

## 2018-04-20 NOTE — MR AVS SNAPSHOT
[address] [facility] 
[phone] Patient: Hector Husain MRN: [id number] VYC:[id number] Visit Information Date & Time Provider Department Dept. Phone Encounter #  
 4/20/2018  9:30 AM Breana Jeronimo, [address] [facility] [phone] [id number] Follow-up Instructions Return in about 1 year (around 4/20/2019). Follow-up and Disposition History Your Appointments 4/27/2018 10:30 AM  
PROCEDURE with [facility]) Appt Note: event monitor per Dr. Juana Barrera [facility]  
[phone] [address] [facility]  
  
    
 4/27/2018 11:00 AM  
Any with Sharif Nguyen MD  
[address] [place] [address] ([facility]) Appt Note: [facility] [address] [phone]  
[place] [phone]  
  
    
 5/9/2019 10:15 AM  
ESTABLISHED PATIENT with Breana Jeronimo MD  
[facility]) Appt Note: annual f/u  
 [id number] [facility]  
[phone] [address] [facility] Upcoming Health Maintenance Date Due DTaP/Tdap/Td series (1 - Tdap) 9/17/1964 FOBT Q 1 YEAR AGE 50-75 9/17/1993 ZOSTER VACCINE AGE 60> 7/17/2003 GLAUCOMA SCREENING Q2Y 9/17/2008 Bone Densitometry (Dexa) Screening 9/17/2008 Pneumococcal 65+ Low/Medium Risk (1 of 2 - PCV13) 9/17/2008 BREAST CANCER SCRN MAMMOGRAM 6/30/2013 Influenza Age 5 to Adult 8/1/2017 MEDICARE YEARLY EXAM 3/14/2018 Allergies as of 4/20/2018  Review Complete On: 4/20/2018 By: Breana Jeronimo MD  
  
 Severity Noted Reaction Type Reactions Latex  07/29/2012    Itching Adhesive Tape-silicones High 13/22/1572    Other (comments) Causes skin to turn red Codeine  05/21/2011    Other (comments)  
 abd pain Crestor [Rosuvastatin]  05/21/2011    Itching Dicyclomine  04/16/2013    Other (comments) Chest pain Iodine And Iodide Containing Products  12/13/2017    Rash Caused itching and a rash Lipitor [Atorvastatin]  12/08/2016    Myalgia Joint pain Other Medication  09/18/2012   Topical Rash Itch Relief Mositurizing Lotion Pcn [Penicillins]  05/21/2011    Hives Current Immunizations  Never Reviewed No immunizations on file. Not reviewed this visit You Were Diagnosed With   
  
 Codes Comments PA[facility]ox[facility]) [facility]    -  Primary ICD-10-CM: I48.0 ICD-9-CM: 427.31 Irregular heartbeat     ICD-10-CM: I49.9 ICD-9-CM: 427.9 Essential hypertension, benign     ICD-10-CM: I10 
ICD-9-CM: 401.1 Mixed hyperlipidemia     ICD-10-CM: E78.2 ICD-9-CM: 272.2 Vitals BP Pulse Resp Height(growth percentile) Weight(growth percentile) SpO2  
 128/80 (BP 1 Location: Right arm, BP Patient Position: Sitting) 80 18 5' (1.524 m) 161 lb 12.8 oz (73.4 kg) 98% BMI OB Status Smoking Status 31.6 kg/m2 Hysterectomy Never Smoker Vitals History BMI and BSA Data Body Mass Index Body Surface Area  
 31.6 kg/m 2 1.76 m 2 Preferred Pharmacy Pharmacy Name Phone [facility] [address], [address] Your Updated Medication List  
  
   
This list is accurate as of 4/20/18 10:36 AM.  Always use your most recent med list. amLODIPine 5 mg tablet Commonly known as:  Sundra [place] TAKE ONE TABLET BY MOUTH ONCE DAILY  
  
 ASPIR-LOW 81 mg tablet Generic drug:  aspirin delayed-release Take 81 mg by mouth daily. BIOTIN PO Take 5,000 mcg by mouth daily.   
  
 clindamycin 2 % vaginal cream  
Commonly known as:  CLEOCIN  
  
 cpap machine kit  
by Does Not Apply route. estradiol 1 mg tablet Commonly known as:  ESTRACE Take 1 mg by mouth daily. ezetimibe 10 mg tablet Commonly known as:  Raul Cresson Take 1 Tab by mouth daily. TAKE ONE TABLET BY MOUTH ONCE DAILY  Indications: BRAND ONLY FISH OIL PO Take 2,000 mg by mouth daily. FLAXSEED OIL PO Take 1,000 mg by mouth daily. hydroCHLOROthiazide 25 mg tablet Commonly known as:  HYDRODIURIL  
TAKE ONE TABLET BY MOUTH ONCE DAILY  
  
 metoprolol succinate 50 mg XL tablet Commonly known as:  TOPROL-XL  
TAKE ONE TABLET BY MOUTH DAILY  
  
 naproxen 500 mg tablet Commonly known as:  NAPROSYN Take 500 mg by mouth two (2) times daily as needed. pitavastatin calcium 4 mg Tab tablet Commonly known as:  LIVALO Take 1 Tab by mouth daily. VITRON-C 65 mg iron- 125 mg Tbec Generic drug:  iron,carbonyl-vitamin C Take 1 Tab by mouth two (2) times a day. We Performed the Following AMB POC EKG ROUTINE W/ 12 LEADS, INTER & REP [73041 CPT(R)] Follow-up Instructions Return in about 1 year (around 4/20/2019). To-Do List   
 04/23/2018 Cardiac Services:  LOOP MONITOR Introducing [facility]! Dear Henri Wang: Thank you for requesting a [employer] account. Our records indicate that you have previously registered for a [employer] account but its currently inactive. Please call our [employer] support line at [phone]. Additional Information If you have questions, please visit the Frequently Asked Questions section of the [employer] website at [email or web address] Remember, [employer] is NOT to be used for urgent needs. For medical emergencies, dial 911. Now available from your iPhone and Android! Please provide this summary of care documentation to your next provider. Your primary care clinician is listed as Mansi Shannon.  If you have any questions after today's visit, please call [phone].

## 2018-04-30 ENCOUNTER — CLINICAL SUPPORT (OUTPATIENT)
Dept: CARDIOLOGY CLINIC | Age: 73
End: 2018-04-30

## 2018-04-30 DIAGNOSIS — I49.9 IRREGULAR HEARTBEAT: ICD-10-CM

## 2018-04-30 DIAGNOSIS — I48.0 PAF (PAROXYSMAL ATRIAL FIBRILLATION) (HCC): ICD-10-CM

## 2018-04-30 DIAGNOSIS — I10 ESSENTIAL HYPERTENSION, BENIGN: ICD-10-CM

## 2018-04-30 DIAGNOSIS — E78.2 MIXED HYPERLIPIDEMIA: ICD-10-CM

## 2018-05-12 ENCOUNTER — HOSPITAL ENCOUNTER (EMERGENCY)
Age: 73
Discharge: HOME | End: 2018-05-12
Attending: EMERGENCY MEDICINE | Admitting: EMERGENCY MEDICINE
Payer: MEDICARE

## 2018-05-12 VITALS
SYSTOLIC BLOOD PRESSURE: 120 MMHG | RESPIRATION RATE: 18 BRPM | DIASTOLIC BLOOD PRESSURE: 64 MMHG | OXYGEN SATURATION: 98 % | BODY MASS INDEX: 30.96 KG/M2 | TEMPERATURE: 98.1 F | HEART RATE: 74 BPM | WEIGHT: 158.51 LBS

## 2018-05-12 DIAGNOSIS — K52.9 GASTROENTERITIS, ACUTE: Primary | ICD-10-CM

## 2018-05-12 LAB
ALBUMIN SERPL-MCNC: 3.5 G/DL (ref 3.5–5)
ALBUMIN/GLOB SERPL: 0.8 {RATIO} (ref 1.1–2.2)
ALP SERPL-CCNC: 67 U/L (ref 45–117)
ALT SERPL-CCNC: 64 U/L (ref 12–78)
ANION GAP SERPL CALC-SCNC: 6 MMOL/L (ref 5–15)
APPEARANCE UR: CLEAR
AST SERPL-CCNC: 51 U/L (ref 15–37)
BACTERIA URNS QL MICRO: NEGATIVE /HPF
BASOPHILS # BLD: 0 K/UL (ref 0–0.1)
BASOPHILS NFR BLD: 0 % (ref 0–1)
BILIRUB SERPL-MCNC: 0.3 MG/DL (ref 0.2–1)
BILIRUB UR QL: NEGATIVE
BUN SERPL-MCNC: 14 MG/DL (ref 6–20)
BUN/CREAT SERPL: 18 (ref 12–20)
CALCIUM SERPL-MCNC: 9 MG/DL (ref 8.5–10.1)
CHLORIDE SERPL-SCNC: 106 MMOL/L (ref 97–108)
CO2 SERPL-SCNC: 29 MMOL/L (ref 21–32)
COLOR UR: ABNORMAL
CREAT SERPL-MCNC: 0.79 MG/DL (ref 0.55–1.02)
DIFFERENTIAL METHOD BLD: ABNORMAL
EOSINOPHIL # BLD: 0.5 K/UL (ref 0–0.4)
EOSINOPHIL NFR BLD: 6 % (ref 0–7)
EPITH CASTS URNS QL MICRO: ABNORMAL /LPF
ERYTHROCYTE [DISTWIDTH] IN BLOOD BY AUTOMATED COUNT: 14.1 % (ref 11.5–14.5)
GLOBULIN SER CALC-MCNC: 4.3 G/DL (ref 2–4)
GLUCOSE SERPL-MCNC: 98 MG/DL (ref 65–100)
GLUCOSE UR STRIP.AUTO-MCNC: NEGATIVE MG/DL
HCT VFR BLD AUTO: 42.2 % (ref 35–47)
HGB BLD-MCNC: 13.7 G/DL (ref 11.5–16)
HGB UR QL STRIP: ABNORMAL
IMM GRANULOCYTES # BLD: 0 K/UL (ref 0–0.04)
IMM GRANULOCYTES NFR BLD AUTO: 0 % (ref 0–0.5)
KETONES UR QL STRIP.AUTO: NEGATIVE MG/DL
LACTATE SERPL-SCNC: 1.2 MMOL/L (ref 0.4–2)
LEUKOCYTE ESTERASE UR QL STRIP.AUTO: ABNORMAL
LIPASE SERPL-CCNC: 149 U/L (ref 73–393)
LYMPHOCYTES # BLD: 2.2 K/UL (ref 0.8–3.5)
LYMPHOCYTES NFR BLD: 29 % (ref 12–49)
MCH RBC QN AUTO: 26.7 PG (ref 26–34)
MCHC RBC AUTO-ENTMCNC: 32.5 G/DL (ref 30–36.5)
MCV RBC AUTO: 82.1 FL (ref 80–99)
MONOCYTES # BLD: 0.8 K/UL (ref 0–1)
MONOCYTES NFR BLD: 11 % (ref 5–13)
NEUTS SEG # BLD: 4.1 K/UL (ref 1.8–8)
NEUTS SEG NFR BLD: 54 % (ref 32–75)
NITRITE UR QL STRIP.AUTO: NEGATIVE
NRBC # BLD: 0 K/UL (ref 0–0.01)
NRBC BLD-RTO: 0 PER 100 WBC
PH UR STRIP: 6 [PH] (ref 5–8)
PLATELET # BLD AUTO: 204 K/UL (ref 150–400)
PMV BLD AUTO: 12 FL (ref 8.9–12.9)
POTASSIUM SERPL-SCNC: 3 MMOL/L (ref 3.5–5.1)
PROT SERPL-MCNC: 7.8 G/DL (ref 6.4–8.2)
PROT UR STRIP-MCNC: NEGATIVE MG/DL
RBC # BLD AUTO: 5.14 M/UL (ref 3.8–5.2)
RBC #/AREA URNS HPF: ABNORMAL /HPF (ref 0–5)
SODIUM SERPL-SCNC: 141 MMOL/L (ref 136–145)
SP GR UR REFRACTOMETRY: 1 (ref 1–1.03)
UROBILINOGEN UR QL STRIP.AUTO: 0.2 EU/DL (ref 0.2–1)
WBC # BLD AUTO: 7.7 K/UL (ref 3.6–11)
WBC URNS QL MICRO: ABNORMAL /HPF (ref 0–4)

## 2018-05-12 PROCEDURE — 81001 URINALYSIS AUTO W/SCOPE: CPT | Performed by: EMERGENCY MEDICINE

## 2018-05-12 PROCEDURE — 96374 THER/PROPH/DIAG INJ IV PUSH: CPT

## 2018-05-12 PROCEDURE — 36415 COLL VENOUS BLD VENIPUNCTURE: CPT | Performed by: EMERGENCY MEDICINE

## 2018-05-12 PROCEDURE — 96375 TX/PRO/DX INJ NEW DRUG ADDON: CPT

## 2018-05-12 PROCEDURE — 99283 EMERGENCY DEPT VISIT LOW MDM: CPT

## 2018-05-12 PROCEDURE — 83690 ASSAY OF LIPASE: CPT | Performed by: EMERGENCY MEDICINE

## 2018-05-12 PROCEDURE — 96361 HYDRATE IV INFUSION ADD-ON: CPT

## 2018-05-12 PROCEDURE — 74011250636 HC RX REV CODE- 250/636: Performed by: EMERGENCY MEDICINE

## 2018-05-12 PROCEDURE — 80053 COMPREHEN METABOLIC PANEL: CPT | Performed by: EMERGENCY MEDICINE

## 2018-05-12 PROCEDURE — 83605 ASSAY OF LACTIC ACID: CPT | Performed by: EMERGENCY MEDICINE

## 2018-05-12 PROCEDURE — 85025 COMPLETE CBC W/AUTO DIFF WBC: CPT | Performed by: EMERGENCY MEDICINE

## 2018-05-12 RX ORDER — ONDANSETRON 2 MG/ML
4 INJECTION INTRAMUSCULAR; INTRAVENOUS
Status: COMPLETED | OUTPATIENT
Start: 2018-05-12 | End: 2018-05-12

## 2018-05-12 RX ORDER — DIPHENOXYLATE HYDROCHLORIDE AND ATROPINE SULFATE 2.5; .025 MG/1; MG/1
1 TABLET ORAL
Qty: 20 TAB | Refills: 0 | Status: SHIPPED | OUTPATIENT
Start: 2018-05-12 | End: 2019-05-09 | Stop reason: ALTCHOICE

## 2018-05-12 RX ORDER — KETOROLAC TROMETHAMINE 30 MG/ML
30 INJECTION, SOLUTION INTRAMUSCULAR; INTRAVENOUS
Status: COMPLETED | OUTPATIENT
Start: 2018-05-12 | End: 2018-05-12

## 2018-05-12 RX ORDER — MORPHINE SULFATE 4 MG/ML
4 INJECTION INTRAVENOUS
Status: DISCONTINUED | OUTPATIENT
Start: 2018-05-12 | End: 2018-05-12 | Stop reason: HOSPADM

## 2018-05-12 RX ADMIN — ONDANSETRON 4 MG: 2 INJECTION INTRAMUSCULAR; INTRAVENOUS at 10:47

## 2018-05-12 RX ADMIN — KETOROLAC TROMETHAMINE 30 MG: 30 INJECTION, SOLUTION INTRAMUSCULAR; INTRAVENOUS at 10:47

## 2018-05-12 RX ADMIN — SODIUM CHLORIDE 1000 ML: 900 INJECTION, SOLUTION INTRAVENOUS at 10:46

## 2018-05-12 NOTE — DISCHARGE INSTRUCTIONS
Gastroenteritis: Care Instructions  Your Care Instructions    Gastroenteritis is an illness that may cause nausea, vomiting, and diarrhea. It is sometimes called \"stomach flu. \" It can be caused by bacteria or a virus. You will probably begin to feel better in 1 to 2 days. In the meantime, get plenty of rest and make sure you do not become dehydrated. Dehydration occurs when your body loses too much fluid. Follow-up care is a key part of your treatment and safety. Be sure to make and go to all appointments, and call your doctor if you are having problems. It's also a good idea to know your test results and keep a list of the medicines you take. How can you care for yourself at home? · If your doctor prescribed antibiotics, take them as directed. Do not stop taking them just because you feel better. You need to take the full course of antibiotics. · Drink plenty of fluids to prevent dehydration, enough so that your urine is light yellow or clear like water. Choose water and other caffeine-free clear liquids until you feel better. If you have kidney, heart, or liver disease and have to limit fluids, talk with your doctor before you increase your fluid intake. · Drink fluids slowly, in frequent, small amounts, because drinking too much too fast can cause vomiting. · Begin eating mild foods, such as dry toast, yogurt, applesauce, bananas, and rice. Avoid spicy, hot, or high-fat foods, and do not drink alcohol or caffeine for a day or two. Do not drink milk or eat ice cream until you are feeling better. How to prevent gastroenteritis  · Keep hot foods hot and cold foods cold. · Do not eat meats, dressings, salads, or other foods that have been kept at room temperature for more than 2 hours. · Use a thermometer to check your refrigerator. It should be between 34°F and 40°F.  · Defrost meats in the refrigerator or microwave, not on the kitchen counter. · Keep your hands and your kitchen clean.  Wash your hands, cutting boards, and countertops with hot soapy water frequently. · Cook meat until it is well done. · Do not eat raw eggs or uncooked sauces made with raw eggs. · Do not take chances. If food looks or tastes spoiled, throw it out. When should you call for help? Call 911 anytime you think you may need emergency care. For example, call if:  ? · You vomit blood or what looks like coffee grounds. ? · You passed out (lost consciousness). ? · You pass maroon or very bloody stools. ?Call your doctor now or seek immediate medical care if:  ? · You have severe belly pain. ? · You have signs of needing more fluids. You have sunken eyes, a dry mouth, and pass only a little dark urine. ? · You feel like you are going to faint. ? · You have increased belly pain that does not go away in 1 to 2 days. ? · You have new or increased nausea, or you are vomiting. ? · You have a new or higher fever. ? · Your stools are black and tarlike or have streaks of blood. ? Watch closely for changes in your health, and be sure to contact your doctor if:  ? · You are dizzy or lightheaded. ? · You urinate less than usual, or your urine is dark yellow or brown. ? · You do not feel better with each day that goes by. Where can you learn more? Go to http://nancy-nitin.info/. Enter N142 in the search box to learn more about \"Gastroenteritis: Care Instructions. \"  Current as of: March 3, 2017  Content Version: 11.4  © 4568-5074 [employer]. Care instructions adapted under license by [employer] (which disclaims liability or warranty for this information). If you have questions about a medical condition or this instruction, always ask your healthcare professional. [address] any warranty or liability for your use of this information.

## 2018-05-12 NOTE — ED NOTES
Pt refused morphine. States that the last time she took morphine it made her almost stop breathing. Stated she is fearful of the drug.   Not listed in allergy list.  Son asked that it be placed on allergy list.  Pt also requests

## 2018-05-12 NOTE — ED NOTES
Dc instructions per Dr. Isis Amezquita  All questions and concerns addressed  Ambulated out of ER with spouse without difficulty.

## 2018-05-12 NOTE — ED PROVIDER NOTES
EMERGENCY DEPARTMENT HISTORY AND PHYSICAL EXAM      Date: 5/12/2018  Patient Name: Kain Zuleta    History of Presenting Illness     Chief Complaint   Patient presents with    Vomiting     N/V/D [facility]. PT SEEN BY HER DOCTOR ON THURSDAY BUT IS NOT DOING ANY BETTER        History Provided By: Patient    HPI: Kain Zuleta, 74 y.o. female with PMHx significant for hypertension, elevated cholesterol, GERD, and diverticulosis, presents to the ED for evaluation of acute nausea, vomiting, and diarrhea that started 4 days ago. Patient states her symptoms started on Wednesday with nausea and diarrhea. She reports starting to vomit on Thursday. She says she went to her PCP who gave her nausea medications. She says her vomiting has resolved but she has continued to have nausea and diarrhea. She reports only moderate relief from her nausea medications this morning. She is additionally complaining of headache, abdominal pain, and generalized body aches. She specifically denies any fevers, chills, chest pain, shortness of breath, rash, sweating, or weight loss. PCP: Pantera Ca MD    There are no other complaints, changes, or physical findings at this time. Current Facility-Administered Medications   Medication Dose Route Frequency Provider Last Rate Last Dose    morphine injection 4 mg  4 mg IntraVENous NOW Nicole Cohen MD         Current Outpatient Prescriptions   Medication Sig Dispense Refill    diphenoxylate-atropine (LOMOTIL) 2.5-0.025 mg per tablet Take 1 Tab by mouth four (4) times daily as needed for Diarrhea (1 tab after each stool for max 8 per day). Max Daily Amount: 4 Tabs.  Take after each stool for a maximum of 8 tablets daily 20 Tab 0    hydroCHLOROthiazide (HYDRODIURIL) 25 mg tablet TAKE ONE TABLET BY MOUTH ONCE DAILY 90 Tab 3    amLODIPine (NORVASC) 5 mg tablet TAKE ONE TABLET BY MOUTH ONCE DAILY 90 Tab 3    metoprolol succinate (TOPROL-XL) 50 mg XL tablet TAKE ONE TABLET BY MOUTH DAILY 90 Tab 1    clindamycin (CLEOCIN) 2 % vaginal cream       ezetimibe (ZETIA) 10 mg tablet Take 1 Tab by mouth daily. TAKE ONE TABLET BY MOUTH ONCE DAILY  Indications: BRAND ONLY 90 Tab 3    pitavastatin (LIVALO) 4 mg tab tablet Take 1 Tab by mouth daily. 90 Tab 3    cpap machine kit by Does Not Apply route.  iron-vitamin C (VITRON-C) 65 mg iron- 125 mg TbEC Take 1 Tab by mouth two (2) times a day.  BIOTIN PO Take 5,000 mcg by mouth daily.  DOCOSAHEXANOIC ACID/EPA (FISH OIL PO) Take 2,000 mg by mouth daily.  FLAXSEED OIL PO Take 1,000 mg by mouth daily.  aspirin delayed-release (ASPIR-LOW) 81 mg tablet Take 81 mg by mouth daily.  estradiol (ESTRACE) 1 mg tablet Take 1 mg by mouth daily.  naproxen (NAPROSYN) 500 mg tablet Take 500 mg by mouth two (2) times daily as needed. Past History     Past Medical History:  Past Medical History:   Diagnosis Date    Anxiety     Arrhythmia     palpitations    Arthritis     Diverticulosis     Fibrocystic breast changes 2011    GERD (gastroesophageal reflux disease)     Hiatal hernia     High cholesterol     Hypertension     Migraine     MARIUSZ (obstructive sleep apnea) 2009    AHI: 15 per hour    Other ill-defined conditions(799.89)     VULVA-BURNING    Unspecified sleep apnea     uses c-pap       Past Surgical History:  Past Surgical History:   Procedure Laterality Date    BREAST SURGERY PROCEDURE UNLISTED      left breast lumpectomy    HX APPENDECTOMY      HX CHOLECYSTECTOMY      HX GYN       x 3, hysterectomy    HX ORTHOPAEDIC      L knee scope    HX ORTHOPAEDIC      left foot bunionectomy    HX RECTOCELE REPAIR      HX UROLOGICAL      BLADDER TACKING       Family History:  History reviewed. No pertinent family history.     Social History:  Social History   Substance Use Topics    Smoking status: Never Smoker    Smokeless tobacco: Never Used    Alcohol use No Allergies: Allergies   Allergen Reactions    Latex Itching    Adhesive Tape-Silicones Other (comments)     Causes skin to turn red    Morphine Other (comments)     States makes her breathing too slow.  Codeine Other (comments)     abd pain      Crestor [Rosuvastatin] Itching    Dicyclomine Other (comments)     Chest pain     Iodine And Iodide Containing Products Rash     Caused itching and a rash    Lipitor [Atorvastatin] Myalgia     Joint pain      Other Medication Rash     Itch Relief Mositurizing Lotion    Pcn [Penicillins] Hives     Review of Systems   Review of Systems   Constitutional: Negative. Negative for activity change, appetite change, chills, fatigue, fever and unexpected weight change. HENT: Negative. Negative for congestion, hearing loss, rhinorrhea, sneezing and voice change. Eyes: Negative. Negative for pain and visual disturbance. Respiratory: Negative. Negative for apnea, cough, choking, chest tightness and shortness of breath. Cardiovascular: Negative. Negative for chest pain and palpitations. Gastrointestinal: Positive for abdominal pain, diarrhea, nausea and vomiting. Negative for abdominal distention and blood in stool. Genitourinary: Negative. Negative for difficulty urinating, flank pain, frequency and urgency. No discharge   Musculoskeletal: Positive for myalgias (generalized). Negative for back pain and neck stiffness. Skin: Negative. Negative for color change and rash. Neurological: Positive for headaches. Negative for dizziness, seizures, syncope, speech difficulty, weakness and numbness. Hematological: Negative for adenopathy. Psychiatric/Behavioral: Negative. Negative for agitation, behavioral problems, dysphoric mood and suicidal ideas. The patient is not nervous/anxious. Physical Exam   Physical Exam   Constitutional: She is oriented to person, place, and time. She appears well-developed and well-nourished. No distress. HENT:   Head: Normocephalic and atraumatic. Mouth/Throat: Oropharynx is clear and moist. No oropharyngeal exudate. Eyes: Conjunctivae and EOM are normal. Pupils are equal, round, and reactive to light. Right eye exhibits no discharge. Left eye exhibits no discharge. Neck: Normal range of motion. Neck supple. Cardiovascular: Normal rate, regular rhythm and intact distal pulses. Exam reveals no gallop and no friction rub. No murmur heard. Pulmonary/Chest: Effort normal and breath sounds normal. No respiratory distress. She has no wheezes. She has no rales. She exhibits no tenderness. Abdominal: Soft. Bowel sounds are normal. She exhibits no distension and no mass. There is no tenderness. There is no rebound and no guarding. Musculoskeletal: Normal range of motion. She exhibits no edema. Lymphadenopathy:     She has no cervical adenopathy. Neurological: She is alert and oriented to person, place, and time. No cranial nerve deficit. Coordination normal.   Skin: Skin is warm and dry. No rash noted. No erythema. Psychiatric: She has a normal mood and affect. Nursing note and vitals reviewed. Diagnostic Study Results     Labs -     Recent Results (from the past 12 hour(s))   CBC WITH AUTOMATED DIFF    Collection Time: 05/12/18 10:17 AM   Result Value Ref Range    WBC 7.7 3.6 - 11.0 K/uL    RBC 5.14 3.80 - 5.20 M/uL    HGB 13.7 11.5 - 16.0 g/dL    HCT 42.2 35.0 - 47.0 %    MCV 82.1 80.0 - 99.0 FL    MCH 26.7 26.0 - 34.0 PG    MCHC 32.5 30.0 - 36.5 g/dL    RDW 14.1 11.5 - 14.5 %    PLATELET 125 509 - 458 K/uL    MPV 12.0 8.9 - 12.9 FL    NRBC 0.0 0  WBC    ABSOLUTE NRBC 0.00 0.00 - 0.01 K/uL    NEUTROPHILS 54 32 - 75 %    LYMPHOCYTES 29 12 - 49 %    MONOCYTES 11 5 - 13 %    EOSINOPHILS 6 0 - 7 %    BASOPHILS 0 0 - 1 %    IMMATURE GRANULOCYTES 0 0.0 - 0.5 %    ABS. NEUTROPHILS 4.1 1.8 - 8.0 K/UL    ABS. LYMPHOCYTES 2.2 0.8 - 3.5 K/UL    ABS. MONOCYTES 0.8 0.0 - 1.0 K/UL    ABS.  EOSINOPHILS 0.5 (H) 0.0 - 0.4 K/UL    ABS. BASOPHILS 0.0 0.0 - 0.1 K/UL    ABS. IMM. GRANS. 0.0 0.00 - 0.04 K/UL    DF AUTOMATED     METABOLIC PANEL, COMPREHENSIVE    Collection Time: 05/12/18 10:17 AM   Result Value Ref Range    Sodium 141 136 - 145 mmol/L    Potassium 3.0 (L) 3.5 - 5.1 mmol/L    Chloride 106 97 - 108 mmol/L    CO2 29 21 - 32 mmol/L    Anion gap 6 5 - 15 mmol/L    Glucose 98 65 - 100 mg/dL    BUN 14 6 - 20 MG/DL    Creatinine 0.79 0.55 - 1.02 MG/DL    BUN/Creatinine ratio 18 12 - 20      GFR est AA >60 >60 ml/min/1.73m2    GFR est non-AA >60 >60 ml/min/1.73m2    Calcium 9.0 8.5 - 10.1 MG/DL    Bilirubin, total 0.3 0.2 - 1.0 MG/DL    ALT (SGPT) 64 12 - 78 U/L    AST (SGOT) 51 (H) 15 - 37 U/L    Alk. phosphatase 67 45 - 117 U/L    Protein, total 7.8 6.4 - 8.2 g/dL    Albumin 3.5 3.5 - 5.0 g/dL    Globulin 4.3 (H) 2.0 - 4.0 g/dL    A-G Ratio 0.8 (L) 1.1 - 2.2     LIPASE    Collection Time: 05/12/18 10:17 AM   Result Value Ref Range    Lipase 149 73 - 393 U/L   LACTIC ACID    Collection Time: 05/12/18 10:17 AM   Result Value Ref Range    Lactic acid 1.2 0.4 - 2.0 MMOL/L   URINALYSIS W/MICROSCOPIC    Collection Time: 05/12/18 10:17 AM   Result Value Ref Range    Color YELLOW/STRAW      Appearance CLEAR CLEAR      Specific gravity 1.005 1.003 - 1.030      pH (UA) 6.0 5.0 - 8.0      Protein NEGATIVE  NEG mg/dL    Glucose NEGATIVE  NEG mg/dL    Ketone NEGATIVE  NEG mg/dL    Bilirubin NEGATIVE  NEG      Blood TRACE (A) NEG      Urobilinogen 0.2 0.2 - 1.0 EU/dL    Nitrites NEGATIVE  NEG      Leukocyte Esterase MODERATE (A) NEG      WBC 10-20 0 - 4 /hpf    RBC 0-5 0 - 5 /hpf    Epithelial cells FEW FEW /lpf    Bacteria NEGATIVE  NEG /hpf     Medical Decision Making   I am the first provider for this patient. I reviewed the vital signs, available nursing notes, past medical history, past surgical history, family history and social history. Vital Signs-Reviewed the patient's vital signs.   Patient Vitals for the past 12 hrs:   Temp Pulse Resp BP SpO2   05/12/18 1109 - - - - 99 %   05/12/18 1108 - - - 119/66 -   05/12/18 0943 98.1 °F (36.7 °C) 74 18 (!) 123/96 99 %     Records Reviewed: Nursing Notes and Old Medical Records    Provider Notes (Medical Decision Making):   DDx: Gastritis, Gastroenteritis, Dehydration, Electrolyte abnormality    ED Course:   Initial assessment performed. The patients presenting problems have been discussed, and they are in agreement with the care plan formulated and outlined with them. I have encouraged them to ask questions as they arise throughout their visit. 11:40 AM  The patient states that their symptoms have resolved and they feel much better. There are no other new complaints at this time. Her questions have been answered. We are awaiting final results and those will be reviewed with them when they become available. Progress Note:  11:43 AM  Updated patient and family on available results and current plan. 11:48 AM  The patient states that their symptoms have resolved and they feel much better. There are no other new complaints at this time. Her questions have been answered. We are awaiting final results and those will be reviewed with them when they become available. Disposition:  11:48 AM  Dennis Dutta  results have been reviewed with her. She has been counseled regarding her diagnosis. She verbally conveys understanding and agreement of the signs, symptoms, diagnosis, treatment and prognosis and additionally agrees to follow up as recommended with Dr. Breana Hdez MD in 24 - 48 hours. She also agrees with the care-plan and conveys that all of her questions have been answered.   I have also put together some discharge instructions for her that include: 1) educational information regarding their diagnosis, 2) how to care for their diagnosis at home, as well a 3) list of reasons why they would want to return to the ED prior to their follow-up appointment, should their condition change. PLAN:  1. Current Discharge Medication List      START taking these medications    Details   diphenoxylate-atropine (LOMOTIL) 2.5-0.025 mg per tablet Take 1 Tab by mouth four (4) times daily as needed for Diarrhea (1 tab after each stool for max 8 per day). Max Daily Amount: 4 Tabs. Take after each stool for a maximum of 8 tablets daily  Qty: 20 Tab, Refills: 0    Associated Diagnoses: Gastroenteritis, acute           2. Follow-up Information     Follow up With Details Comments Contact Info    Rosi Sewell MD Call in 2 days As needed, If symptoms worsen [address] [place] [address]  [phone]          Return to ED if worse     Diagnosis     Clinical Impression:   1. Gastroenteritis, acute        Attestations: This note is prepared by Kallie Hauser, acting as Scribe for [employer] Inc[employer]ilibeth Kaplan MD: The scribe's documentation has been prepared under my direction and personally reviewed by me in its entirety. I confirm that the note above accurately reflects all work, treatment, procedures, and medical decision making performed by me.

## 2018-05-17 ENCOUNTER — TELEPHONE (OUTPATIENT)
Dept: CARDIOLOGY CLINIC | Age: 73
End: 2018-05-17

## 2018-05-17 NOTE — TELEPHONE ENCOUNTER
Verified patient with two identifiers. Pt informed of monitor results. Pt verbalized understanding. She will keep us posted on any change in symptoms.

## 2018-05-17 NOTE — TELEPHONE ENCOUNTER
----- Message from Dionne Watkins MD sent at 5/16/2018  9:19 PM EDT -----  No evidence of recurrent atrial fibrillation. Continue same, follow-up in 1 year if doing well.

## 2018-06-12 RX ORDER — PITAVASTATIN CALCIUM 4.18 MG/1
TABLET, FILM COATED ORAL
Qty: 30 TAB | Refills: 11 | Status: SHIPPED | OUTPATIENT
Start: 2018-06-12 | End: 2018-07-30 | Stop reason: SDUPTHER

## 2018-08-13 ENCOUNTER — TELEPHONE (OUTPATIENT)
Dept: CARDIOLOGY CLINIC | Age: 73
End: 2018-08-13

## 2018-08-13 NOTE — TELEPHONE ENCOUNTER
Please call [facility] intern pharmacist w/[facility]. Patient needs a new script for Zetia 10mg, script says brand only. So it went up to a higher price. Needs a script for generic.  Thanks

## 2018-08-16 RX ORDER — EZETIMIBE 10 MG/1
10 TABLET ORAL DAILY
Qty: 90 TAB | Refills: 3 | Status: SHIPPED | OUTPATIENT
Start: 2018-08-16 | End: 2019-01-04 | Stop reason: SDUPTHER

## 2018-08-21 ENCOUNTER — TELEPHONE (OUTPATIENT)
Dept: CARDIOLOGY CLINIC | Age: 73
End: 2018-08-21

## 2018-08-21 DIAGNOSIS — R00.2 PALPITATIONS: ICD-10-CM

## 2018-08-21 DIAGNOSIS — I10 ESSENTIAL HYPERTENSION, BENIGN: ICD-10-CM

## 2018-08-21 DIAGNOSIS — E78.2 MIXED HYPERLIPIDEMIA: ICD-10-CM

## 2018-08-21 DIAGNOSIS — I48.0 PAF (PAROXYSMAL ATRIAL FIBRILLATION) (HCC): Primary | ICD-10-CM

## 2018-08-23 ENCOUNTER — HOSPITAL ENCOUNTER (OUTPATIENT)
Dept: GENERAL RADIOLOGY | Age: 73
Discharge: HOME | End: 2018-08-23
Payer: MEDICARE

## 2018-08-23 DIAGNOSIS — R52 PAIN: ICD-10-CM

## 2018-08-23 PROCEDURE — 72072 X-RAY EXAM THORAC SPINE 3VWS: CPT

## 2018-10-11 ENCOUNTER — HOSPITAL ENCOUNTER (OUTPATIENT)
Dept: LAB | Age: 73
Discharge: HOME | End: 2018-10-11
Payer: MEDICARE

## 2018-10-11 PROCEDURE — 85027 COMPLETE CBC AUTOMATED: CPT

## 2018-10-11 PROCEDURE — 36415 COLL VENOUS BLD VENIPUNCTURE: CPT

## 2018-10-11 PROCEDURE — 80053 COMPREHEN METABOLIC PANEL: CPT

## 2018-10-11 PROCEDURE — 80061 LIPID PANEL: CPT

## 2018-10-12 LAB
ALBUMIN SERPL-MCNC: 4.4 G/DL (ref 3.5–4.8)
ALBUMIN/GLOB SERPL: 1.6 {RATIO} (ref 1.2–2.2)
ALP SERPL-CCNC: 68 IU/L (ref 39–117)
ALT SERPL-CCNC: 22 IU/L (ref 0–32)
AST SERPL-CCNC: 24 IU/L (ref 0–40)
BILIRUB SERPL-MCNC: 0.3 MG/DL (ref 0–1.2)
BUN SERPL-MCNC: 16 MG/DL (ref 8–27)
BUN/CREAT SERPL: 22 (ref 12–28)
CALCIUM SERPL-MCNC: 9.8 MG/DL (ref 8.7–10.3)
CHLORIDE SERPL-SCNC: 101 MMOL/L (ref 96–106)
CHOLEST SERPL-MCNC: 164 MG/DL (ref 100–199)
CO2 SERPL-SCNC: 27 MMOL/L (ref 20–29)
CREAT SERPL-MCNC: 0.73 MG/DL (ref 0.57–1)
ERYTHROCYTE [DISTWIDTH] IN BLOOD BY AUTOMATED COUNT: 14.4 % (ref 12.3–15.4)
GLOBULIN SER CALC-MCNC: 2.7 G/DL (ref 1.5–4.5)
GLUCOSE SERPL-MCNC: 99 MG/DL (ref 65–99)
HCT VFR BLD AUTO: 42.7 % (ref 34–46.6)
HDLC SERPL-MCNC: 57 MG/DL
HGB BLD-MCNC: 13.7 G/DL (ref 11.1–15.9)
INTERPRETATION, 910389: NORMAL
LDLC SERPL CALC-MCNC: 86 MG/DL (ref 0–99)
MCH RBC QN AUTO: 26.7 PG (ref 26.6–33)
MCHC RBC AUTO-ENTMCNC: 32.1 G/DL (ref 31.5–35.7)
MCV RBC AUTO: 83 FL (ref 79–97)
PLATELET # BLD AUTO: 233 X10E3/UL (ref 150–379)
POTASSIUM SERPL-SCNC: 4.4 MMOL/L (ref 3.5–5.2)
PROT SERPL-MCNC: 7.1 G/DL (ref 6–8.5)
RBC # BLD AUTO: 5.13 X10E6/UL (ref 3.77–5.28)
SODIUM SERPL-SCNC: 143 MMOL/L (ref 134–144)
TRIGL SERPL-MCNC: 103 MG/DL (ref 0–149)
VLDLC SERPL CALC-MCNC: 21 MG/DL (ref 5–40)
WBC # BLD AUTO: 7.6 X10E3/UL (ref 3.4–10.8)

## 2018-10-19 ENCOUNTER — TELEPHONE (OUTPATIENT)
Dept: CARDIOLOGY CLINIC | Age: 73
End: 2018-10-19

## 2018-10-19 NOTE — TELEPHONE ENCOUNTER
----- Message from Kassidy Liu MD sent at 10/18/2018  5:23 PM EDT -----  Cholesterol close to goal.  Continue same.

## 2018-10-19 NOTE — TELEPHONE ENCOUNTER
Patient informed concerned with medication coverage with new medicare plan to be able to continue the statin medication.

## 2018-11-06 ENCOUNTER — HOSPITAL ENCOUNTER (OUTPATIENT)
Dept: SLEEP MEDICINE | Age: 73
Discharge: HOME | End: 2018-11-06
Payer: MEDICARE

## 2018-11-06 ENCOUNTER — OFFICE VISIT (OUTPATIENT)
Dept: SLEEP MEDICINE | Age: 73
End: 2018-11-06

## 2018-11-06 VITALS
OXYGEN SATURATION: 96 % | DIASTOLIC BLOOD PRESSURE: 63 MMHG | BODY MASS INDEX: 27.38 KG/M2 | HEART RATE: 71 BPM | HEIGHT: 61 IN | WEIGHT: 145 LBS | SYSTOLIC BLOOD PRESSURE: 101 MMHG

## 2018-11-06 DIAGNOSIS — Z86.59 H/O ANXIETY DISORDER: ICD-10-CM

## 2018-11-06 DIAGNOSIS — G47.33 OSA (OBSTRUCTIVE SLEEP APNEA): Primary | ICD-10-CM

## 2018-11-06 DIAGNOSIS — I10 ESSENTIAL HYPERTENSION: ICD-10-CM

## 2018-11-06 PROCEDURE — 95806 SLEEP STUDY UNATT&RESP EFFT: CPT | Performed by: INTERNAL MEDICINE

## 2018-11-06 RX ORDER — AMLODIPINE BESYLATE 5 MG/1
TABLET ORAL
COMMUNITY
Start: 2017-06-08 | End: 2019-01-04 | Stop reason: SDUPTHER

## 2018-11-06 RX ORDER — PANTOPRAZOLE SODIUM 40 MG/1
TABLET, DELAYED RELEASE ORAL
COMMUNITY
Start: 2018-11-01 | End: 2021-09-20

## 2018-11-06 NOTE — PROGRESS NOTES
[address] [place]. [place], [address]  Tel.  [phone]  Fax. [address] [facility] [address]  [place], [address] [place]  Tel.  [phone]  Fax. [phone] [address] [address]  Tel.  [phone]  Fax. [phone]       S>Ada Finnegan is a 74 y.o. female seen today to receive a home sleep testing unit (HST). · Patient was educated on proper hookup and operation of the HST. · Instruction forms and documentation were reviewed and signed. · The patient demonstrated good understanding of the HST. O>    Visit Vitals  /63   Pulse 71   Ht 5' 1\" (1.549 m)   Wt 145 lb (65.8 kg)   SpO2 96%   BMI 27.40 kg/m²         A>  1. MARIUSZ (obstructive sleep apnea)    2. BMI 27.0-27.9,adult    3. Essential hypertension    4. H/O anxiety disorder          P>  · General information regarding operations and maintenance of the device was provided. · She was provided information on sleep apnea including coresponding risk factors and the importance of proper treatment. · Follow-up appointment was made to return the HST. She will be contacted once the results have been reviewed. · She was asked to contact our office for any problems regarding her home sleep test study.

## 2018-11-06 NOTE — PROGRESS NOTES
[address] [place]. [place], [address]  Tel.  [phone]  Fax. 100 [facility] [address]  [place], [address]  Tel.  [phone]  Fax. [phone] [address] [facility] [address]  Tel.  [phone]  Fax. [phone]     S>Ada Matute is a 74 y.o. female seen for a positive airway pressure follow-up. She reports no problems using the device. She is 86% compliant over the past 90 days. The following problems are identified:    Drowsiness no Problems exhaling no   Snoring no Forget to put on no   Mask Comfortable yes Can't fall asleep no   Dry Mouth no Mask falls off no   Air Leaking no Frequent awakenings no         She admits that her sleep has improved. Allergies   Allergen Reactions    Latex Itching    Adhesive Tape-Silicones Other (comments)     Causes skin to turn red    Morphine Other (comments)     States makes her breathing too slow.  Codeine Other (comments)     abd pain      Crestor [Rosuvastatin] Itching    Dicyclomine Other (comments)     Chest pain     Iodine And Iodide Containing Products Rash     Caused itching and a rash    Lipitor [Atorvastatin] Myalgia     Joint pain      Other Medication Rash     Itch Relief Mositurizing Lotion    Pcn [Penicillins] Hives       She has a current medication list which includes the following prescription(s): amlodipine, pitavastatin calcium, hydrochlorothiazide, amlodipine, metoprolol succinate, clindamycin, cpap machine, iron,carbonyl-vitamin c, biotin, flaxseed oil, aspirin delayed-release, estradiol, naproxen, pantoprazole, ezetimibe, diphenoxylate-atropine, and docosahexanoic acid/epa. .      She  has a past medical history of Anxiety, Arrhythmia, Arthritis, Diverticulosis, Fibrocystic breast changes, GERD (gastroesophageal reflux disease), Hiatal hernia, High cholesterol, Hypertension, Migraine, MARIUSZ (obstructive sleep apnea), Other ill-defined conditions(224.54), and Unspecified sleep apnea. [place] Sleepiness Score: 0   and Modified F.O.S.Q. Score Total / 2: 20   which reflect improved sleep quality over therapy time. O>    Visit Vitals  /63   Pulse 71   Ht 5' 1\" (1.549 m)   Wt 145 lb (65.8 kg)   SpO2 96%   BMI 27.40 kg/m²         General:   Not in acute distress   Eyes:  Anicteric sclerae, no obvious strabismus   Nose:  No obvious nasal septum deviation    Oropharynx:   Class 4 oropharyngeal outlet, thick tongue base, uvula not seen due to low-lying soft palate, narrow tonsilo-pharyngeal pilars   Tonsils:   tonsils are not visualized due to low-lying soft palate   Neck:   midline trachea   Chest/Lungs:  Equal lung expansion, clear on auscultation    CVS:  Normal rate, regular rhythm; no JVD   Skin:  Warm to touch; no obvious rashes   Neuro:  No focal deficits ; no obvious tremor    Psych:  Normal affect,  normal countenance;           A>    ICD-10-CM ICD-9-CM    1. MARIUSZ (obstructive sleep apnea) G47.33 327.23 SLEEP STUDY UNATTENDED, 4 CHANNEL   2. BMI 27.0-27.9,adult Z68.27 V85.23    3. Essential hypertension I10 401.9    4. H/O anxiety disorder Z86.59 V11.8      AHI = 15. On CPAP :  6 cmH2O. Compliant:      yes    Therapeutic Response:  Positive    P>    Orders Placed This Encounter    SLEEP STUDY UNATTENDED, 4 CHANNEL     Order Specific Question:   Reason for Exam     Answer:   MARIUSZ    pantoprazole (PROTONIX) 40 mg tablet    amLODIPine (NORVASC) 5 mg tablet     Sig: Take one tab daily     * Patient agrees to HSAT due to interim weight loss of 20 lbs in past 2 years. She is reluctant to discontinue therapy. * Patient agrees to telephone [phone]  follow-up by myself or lead sleep technologist shortly after sleep study to review results and plan final management.     (patient has given permission for a message to be left regarding test results and further management if patient cannot be cannot be reached directly).     * We have recommended a dedicated weight loss through appropriate diet and an exercise regiment as significant weight reduction has been shown to reduce severity of obstructive sleep apnea. * Follow-up Disposition:  Return in about 1 year (around 11/6/2019), or if symptoms worsen or fail to improve. * She was asked to contact our office for any problems regarding PAP therapy. * Counseling was provided regarding the importance of regular PAP use and on proper sleep hygiene and safe driving. * Re-enforced proper and regular cleaning for the device. Thank you for allowing us to participate in your patient's medical care. Eloy Valenzuela MD, FAASM  Electronically signed.  11/06/18

## 2018-11-06 NOTE — PATIENT INSTRUCTIONS
[address] [place]. [place], [address]  Tel.  [phone]  Fax. [address] [facility] [address]  [place], [address] [facility]  Tel.  [phone]  Fax. [phone] [address]  Tel.  [phone]  Fax. [phone]     Sleep Apnea: After Your Visit  Your Care Instructions  Sleep apnea occurs when you frequently stop breathing for 10 seconds or longer during sleep. It can be mild to severe, based on the number of times per hour that you stop breathing or have slowed breathing. Blocked or narrowed airways in your nose, mouth, or throat can cause sleep apnea. Your airway can become blocked when your throat muscles and tongue relax during sleep. Sleep apnea is common, occurring in 1 out of 20 individuals. Individuals having any of the following characteristics should be evaluated and treated right away due to high risk and detrimental consequences from untreated sleep apnea:  1. Obesity  2. Congestive Heart failure  3. Atrial Fibrillation  4. Uncontrolled Hypertension  5. Type II Diabetes  6. Night-time Arrhythmias  7. Stroke  8. Pulmonary Hypertension  9. High-risk Driving Populations (pilots, truck drivers, etc.)  10. Patients Considering Weight-loss Surgery    How do you know you have sleep apnea? You probably have sleep apnea if you answer 'yes' to 3 or more of the following questions:  S - Have you been told that you Snore? T - Are you often Tired during the day? O - Has anyone Observed you stop breathing while sleeping? P- Do you have (or are being treated for) high blood Pressure? B - Are you obese (Body Mass Index > 35)? A - Is your Age 48years old or older? N - Is your Neck size greater than 16 inches? G - Are you male Gender? A sleep physician can prescribe a breathing device that prevents tissues in the throat from blocking your airway.  Or your doctor may recommend using a dental device (oral breathing device) to help keep your airway open. In some cases, surgery may be needed to remove enlarged tissues in the throat. Follow-up care is a key part of your treatment and safety. Be sure to make and go to all appointments, and call your doctor if you are having problems. It's also a good idea to know your test results and keep a list of the medicines you take. How can you care for yourself at home? · Lose weight, if needed. It may reduce the number of times you stop breathing or have slowed breathing. · Go to bed at the same time every night. · Sleep on your side. It may stop mild apnea. If you tend to roll onto your back, sew a pocket in the back of your pajama top. Put a tennis ball into the pocket, and stitch the pocket shut. This will help keep you from sleeping on your back. · Avoid alcohol and medicines such as sleeping pills and sedatives before bed. · Do not smoke. Smoking can make sleep apnea worse. If you need help quitting, talk to your doctor about stop-smoking programs and medicines. These can increase your chances of quitting for good. · Prop up the head of your bed 4 to 6 inches by putting bricks under the legs of the bed. · Treat breathing problems, such as a stuffy nose, caused by a cold or allergies. · Use a continuous positive airway pressure (CPAP) breathing machine if lifestyle changes do not help your apnea and your doctor recommends it. The machine keeps your airway from closing when you sleep. · If CPAP does not help you, ask your doctor whether you should try other breathing machines. A bilevel positive airway pressure machine has two types of air pressureâone for breathing in and one for breathing out. Another device raises or lowers air pressure as needed while you breathe. · If your nose feels dry or bleeds when using one of these machines, talk with your doctor about increasing moisture in the air. A humidifier may help.   · If your nose is runny or stuffy from using a breathing machine, talk with your doctor about using decongestants or a corticosteroid nasal spray. When should you call for help? Watch closely for changes in your health, and be sure to contact your doctor if:  · You still have sleep apnea even though you have made lifestyle changes. · You are thinking of trying a device such as CPAP. · You are having problems using a CPAP or similar machine. Where can you learn more? Go to [employer]. Enter V562 in the search box to learn more about \"Sleep Apnea: After Your Visit. \"   © 4138-1948 Healthwise, Incorporated. Care instructions adapted under license by [employer] (which disclaims liability or warranty for this information). This care instruction is for use with your licensed healthcare professional. If you have questions about a medical condition or this instruction, always ask your healthcare professional. Demetrius Pierret any warranty or liability for your use of this information. PROPER SLEEP HYGIENE    What to avoid  · Do not have drinks with caffeine, such as coffee or black tea, for 8 hours before bed. · Do not smoke or use other types of tobacco near bedtime. Nicotine is a stimulant and can keep you awake. · Avoid drinking alcohol late in the evening, because it can cause you to wake in the middle of the night. · Do not eat a big meal close to bedtime. If you are hungry, eat a light snack. · Do not drink a lot of water close to bedtime, because the need to urinate may wake you up during the night. · Do not read or watch TV in bed. Use the bed only for sleeping and sexual activity. What to try  · Go to bed at the same time every night, and wake up at the same time every morning. Do not take naps during the day. · Keep your bedroom quiet, dark, and cool. · Get regular exercise, but not within 3 to 4 hours of your bedtime. .  · Sleep on a comfortable pillow and mattress.   · If watching the clock makes you anxious, turn it facing away from you so you cannot see the time. · If you worry when you lie down, start a worry book. Well before bedtime, write down your worries, and then set the book and your concerns aside. · Try meditation or other relaxation techniques before you go to bed. · If you cannot fall asleep, get up and go to another room until you feel sleepy. Do something relaxing. Repeat your bedtime routine before you go to bed again. · Make your house quiet and calm about an hour before bedtime. Turn down the lights, turn off the TV, log off the computer, and turn down the volume on music. This can help you relax after a busy day. Drowsy Driving  The [address] [place] [address] Road Traffic Safety Administration cites drowsiness as a causing factor in more than 617,843 police reported crashes annually, resulting in 76,000 injuries and 1,500 deaths. Other surveys suggest 55% of people polled have driven while drowsy in the past year, 23% had fallen asleep but not crashed, 3% crashed, and 2% had and accident due to drowsy driving. Who is at risk? Young Drivers: One study of drowsy driving accidents states that 55% of the drivers were under 25 years. Of those, 75% were male. Shift Workers and Travelers: People who work overnight or travel across time zones frequently are at higher risk of experiencing Circadian Rhythm Disorders. They are trying to work and function when their body is programed to sleep. Sleep Deprived: Lack of sleep has a serious impact on your ability to pay attention or focus on a task. Consistently getting less than the average of 8 hours your body needs creates partial or cumulative sleep deprivation. Untreated Sleep Disorders: Sleep Apnea, Narcolepsy, R.L.S., and other sleep disorders (untreated) prevent a person from getting enough restful sleep. This leads to excessive daytime sleepiness and increases the risk for drowsy driving accidents by up to 7 times.   Medications / Alcohol: Even over the counter medications can cause drowsiness. Medications that impair a drivers attention should have a warning label. Alcohol naturally makes you sleepy and on its own can cause accidents. Combined with excessive drowsiness its effects are amplified. Signs of Drowsy Driving:   * You don't remember driving the last few miles   * You may drift out of your arnold   * You are unable to focus and your thoughts wander   * You may yawn more often than normal   * You have difficulty keeping your eyes open / nodding off   * Missing traffic signs, speeding, or tailgating  Prevention-   Good sleep hygiene, lifestyle and behavioral choices have the most impact on drowsy driving. There is no substitute for sleep and the average person requires 8 hours nightly. If you find yourself driving drowsy, stop and sleep. Consider the sleep hygiene tips provided during your visit as well. Medication Refill Policy: Refills for all medications require 1 week advance notice. Please have your pharmacy fax a refill request. We are unable to fax, or call in \"controled substance\" medications and you will need to pick these prescriptions up from our office. [employer] Activation    Thank you for requesting access to [employer]. Please follow the instructions below to securely access and download your online medical record. [employer] allows you to send messages to your doctor, view your test results, renew your prescriptions, schedule appointments, and more. How Do I Sign Up? 1. In your internet browser, go to [email or web address]. 2. Click on the First Time User? Click Here link in the Sign In box. You will see the New Member Sign Up page. 3. Enter your [employer] Access Code exactly as it appears below. You will not need to use this code after youve completed the sign-up process. If you do not sign up before the expiration date, you must request a new code.     [employer] Access Code: [email or web address]  Expires: 11/21/2018 10:42 AM (This is the date your [employer] access code will )    4. Enter the last four digits of your Social Security Number (xxxx) and Date of Birth (mm/dd/yyyy) as indicated and click Submit. You will be taken to the next sign-up page. 5. Create a Elixentt ID. This will be your [employer] login ID and cannot be changed, so think of one that is secure and easy to remember. 6. Create a [employer] password. You can change your password at any time. 7. Enter your Password Reset Question and Answer. This can be used at a later time if you forget your password. 8. Enter your e-mail address. You will receive e-mail notification when new information is available in [address] 9. Click Sign Up. You can now view and download portions of your medical record. 10. Click the Download Summary menu link to download a portable copy of your medical information. Additional Information    If you have questions, please call [phone]. Remember, [employer] is NOT to be used for urgent needs. For medical emergencies, dial 911.

## 2018-11-07 ENCOUNTER — TELEPHONE (OUTPATIENT)
Dept: SLEEP MEDICINE | Age: 73
End: 2018-11-07

## 2018-11-07 NOTE — TELEPHONE ENCOUNTER
Results of Sleep Testing reviewed with patient who agrees to continuation of PAP therapy. Patient encouraged to call if there were any further questions regarding sleep symptoms.

## 2018-11-07 NOTE — TELEPHONE ENCOUNTER
[facility]    Date of Study: 11/6/18    The following information was gathered from the patients study log:    · Lights off: 11:30P  · Estimated sleep onset: 12:30A    · Awakened a total of 3 times  · The patient felt they slept 5 hours  · Patient took no sleep aid before starting the test  · Sleep quality was worse compared to a usual nights sleep.     Further information provided: \"got up at 5:27am\"

## 2018-12-06 RX ORDER — METOPROLOL SUCCINATE 50 MG/1
TABLET, EXTENDED RELEASE ORAL
Qty: 90 TAB | Refills: 1 | Status: SHIPPED | OUTPATIENT
Start: 2018-12-06 | End: 2018-12-13 | Stop reason: SDUPTHER

## 2018-12-06 NOTE — TELEPHONE ENCOUNTER
Patient states that she has been without metoprolol medication for 3 days now. Patient would like a refill sent to [address] [address] in [address] [place] [address]. (Confirmed pharmacy in chart). Patient will, also, have new insurance in January 1, 2019 [facility]). Patient will like for all medications to be 90 day supply. Patient states she would like a written rx for the medications and she will mail to the appropriate mail order pharmacy.      **Patient is leaving on January 2, 2019 and would like the written scripts before she leaves to go to [place]

## 2018-12-11 ENCOUNTER — TELEPHONE (OUTPATIENT)
Dept: CARDIOLOGY CLINIC | Age: 73
End: 2018-12-11

## 2018-12-12 NOTE — TELEPHONE ENCOUNTER
Please call pt regarding needing a handwritten prescription sent in mail for 90days. I told her I think the prescriptions are sent to a pharmacy but she is insisting on getting them sent to her in the mail.  Please give pt a call back    thanks

## 2018-12-12 NOTE — TELEPHONE ENCOUNTER
Patient very upset still has not received written scripts on her medication. I informed her Metoprolol has been called in. She wants written scripts and a phone call once this has been completed.     Thanks  Ander Aldridge

## 2018-12-13 ENCOUNTER — TELEPHONE (OUTPATIENT)
Dept: CARDIOLOGY CLINIC | Age: 73
End: 2018-12-13

## 2018-12-13 RX ORDER — METOPROLOL SUCCINATE 50 MG/1
TABLET, EXTENDED RELEASE ORAL
Qty: 90 TAB | Refills: 2 | Status: SHIPPED | OUTPATIENT
Start: 2018-12-13 | End: 2019-01-04 | Stop reason: SDUPTHER

## 2018-12-17 NOTE — TELEPHONE ENCOUNTER
Spoke with spouse on Hippa Verified patient with two identifiers.  Requesting medications to be sent to [facility] e mail before the 1st.

## 2019-01-04 RX ORDER — METOPROLOL SUCCINATE 50 MG/1
TABLET, EXTENDED RELEASE ORAL
Qty: 90 TAB | Refills: 3 | Status: SHIPPED | OUTPATIENT
Start: 2019-01-04

## 2019-01-04 RX ORDER — EZETIMIBE 10 MG/1
10 TABLET ORAL DAILY
Qty: 90 TAB | Refills: 3 | Status: SHIPPED | OUTPATIENT
Start: 2019-01-04

## 2019-01-04 RX ORDER — AMLODIPINE BESYLATE 5 MG/1
TABLET ORAL
Qty: 90 TAB | Refills: 3 | Status: SHIPPED | OUTPATIENT
Start: 2019-01-04

## 2019-01-04 RX ORDER — HYDROCHLOROTHIAZIDE 25 MG/1
TABLET ORAL
Qty: 90 TAB | Refills: 3 | Status: SHIPPED | OUTPATIENT
Start: 2019-01-04

## 2019-05-03 ENCOUNTER — TELEPHONE (OUTPATIENT)
Dept: CARDIOLOGY CLINIC | Age: 74
End: 2019-05-03

## 2019-05-03 DIAGNOSIS — I48.0 PAROXYSMAL ATRIAL FIBRILLATION (HCC): ICD-10-CM

## 2019-05-03 DIAGNOSIS — I10 ESSENTIAL HYPERTENSION, BENIGN: Primary | ICD-10-CM

## 2019-05-03 DIAGNOSIS — E78.2 MIXED HYPERLIPIDEMIA: ICD-10-CM

## 2019-05-09 ENCOUNTER — OFFICE VISIT (OUTPATIENT)
Dept: CARDIOLOGY CLINIC | Age: 74
End: 2019-05-09

## 2019-05-09 VITALS
HEART RATE: 60 BPM | BODY MASS INDEX: 30.19 KG/M2 | DIASTOLIC BLOOD PRESSURE: 60 MMHG | SYSTOLIC BLOOD PRESSURE: 140 MMHG | HEIGHT: 61 IN | OXYGEN SATURATION: 97 % | WEIGHT: 159.9 LBS | RESPIRATION RATE: 16 BRPM

## 2019-05-09 DIAGNOSIS — I48.0 PAROXYSMAL ATRIAL FIBRILLATION (HCC): ICD-10-CM

## 2019-05-09 DIAGNOSIS — E78.2 MIXED HYPERLIPIDEMIA: ICD-10-CM

## 2019-05-09 DIAGNOSIS — I10 ESSENTIAL HYPERTENSION, BENIGN: ICD-10-CM

## 2019-05-09 DIAGNOSIS — R07.2 PRECORDIAL PAIN: Primary | ICD-10-CM

## 2019-05-09 NOTE — PROGRESS NOTES
Michele High, [facility] Subjective/HPI:  
 
Ms. Talia Baez is a 74 y.o. female is here for routine f/u. She has a PMHx of PAF, HTN, MARIUSZ and HLD. She reports chest tightness that radiates to the back associated with exertion. The pain is intermittent but often occurs with activity and with increased anxiety. She had an episode yesterday while at the grocery store that last about 15 minutes. She notes some associated nausea with this, but denies shortness of breath or dizziness. She attributes these symptoms to indigestion. She denies complaints of edema, PND or orthopnea. She denies shortness of breath on exertion. She has some fluttering sensations from time to time, especially more when she is anxious. She tells me she worries about absolutely everything and can't stop worrying. PCP Provider Sharon Morris MD 
 
Patient Active Problem List  
Diagnosis Code  Palpitations R00.2  Tick bite W57. Garrie Penning  Arthritis M19.90  
 Fibrocystic breast changes N60.19  
 History of emotional problems F48.9  Mixed hyperlipidemia E78.2  
 Essential hypertension, benign I10  Atrial fibrillation (HCC) I48.91  
 Obstructive sleep apnea (adult) (pediatric) G47.33  
 RLQ abdominal pain R10.31  
 Hematuria R31.9  Anemia D64.9  
 PAF (paroxysmal atrial fibrillation) (HCC) I48.0  Irregular heartbeat I49.9 Past Surgical History:  
Procedure Laterality Date [address] Real left breast lumpectomy  HX APPENDECTOMY  HX CHOLECYSTECTOMY  HX GYN    
  x 3, hysterectomy  HX ORTHOPAEDIC    
 L knee scope  HX ORTHOPAEDIC    
 left foot bunionectomy  HX RECTOCELE REPAIR    
 HX UROLOGICAL BLADDER TACKING History reviewed. No pertinent family history. Social History Socioeconomic History  Marital status:  Spouse name: Not on file  Number of children: Not on file  Years of education: Not on file  Highest education level: Not on file Occupational History  Not on file Social Needs  Financial resource strain: Not on file  Food insecurity:  
  Worry: Not on file Inability: Not on file  Transportation needs:  
  Medical: Not on file Non-medical: Not on file Tobacco Use  Smoking status: Never Smoker  Smokeless tobacco: Never Used Substance and Sexual Activity  Alcohol use: No  
 Drug use: No  
 Sexual activity: Yes  
  Partners: Male Lifestyle  Physical activity:  
  Days per week: Not on file Minutes per session: Not on file  Stress: Not on file Relationships  Social connections:  
  Talks on phone: Not on file Gets together: Not on file Attends Hinduism service: Not on file Active member of club or organization: Not on file Attends meetings of clubs or organizations: Not on file Relationship status: Not on file  Intimate partner violence:  
  Fear of current or ex partner: Not on file Emotionally abused: Not on file Physically abused: Not on file Forced sexual activity: Not on file Other Topics Concern  Not on file Social History Narrative  Not on file Allergies Allergen Reactions  Latex Itching  Adhesive Tape-Silicones Other (comments) Causes skin to turn red  Morphine Other (comments) States makes her breathing too slow.  Codeine Other (comments)  
  abd pain  Crestor [Rosuvastatin] Itching  Dicyclomine Other (comments) Chest pain  Iodine And Iodide Containing Products Rash Caused itching and a rash  Lipitor [Atorvastatin] Myalgia Joint pain  Other Medication Rash Itch Relief Mositurizing Lotion  Pcn [Penicillins] Hives Current Outpatient Medications Medication Sig  
 amLODIPine (NORVASC) 5 mg tablet TAKE ONE TABLET BY MOUTH ONCE DAILY  ezetimibe (ZETIA) 10 mg tablet Take 1 Tab by mouth daily. TAKE ONE TABLET BY MOUTH ONCE DAILY  hydroCHLOROthiazide (HYDRODIURIL) 25 mg tablet TAKE ONE TABLET BY MOUTH ONCE DAILY  metoprolol succinate (TOPROL-XL) 50 mg XL tablet TAKE ONE TABLET BY MOUTH ONCE DAILY  pitavastatin calcium (LIVALO) 4 mg tab tablet TAKE ONE TABLET BY MOUTH ONCE DAILY  pantoprazole (PROTONIX) 40 mg tablet  cpap machine kit by Does Not Apply route.  BIOTIN PO Take 5,000 mcg by mouth daily.  FLAXSEED OIL PO Take 1,000 mg by mouth daily.  aspirin delayed-release (ASPIR-LOW) 81 mg tablet Take 81 mg by mouth daily.  estradiol (ESTRACE) 1 mg tablet Take 1 mg by mouth daily.  naproxen (NAPROSYN) 500 mg tablet Take 500 mg by mouth two (2) times daily as needed. No current facility-administered medications for this visit. I have reviewed the problem list, allergy list, medical history, family, social history and medications. Review of Symptoms: 
 
Review of Systems Constitutional: Negative for chills, fever and weight loss. HENT: Negative for nosebleeds. Eyes: Negative for blurred vision and double vision. Respiratory: Negative for cough, shortness of breath and wheezing. Cardiovascular: Positive for chest pain and palpitations. Negative for orthopnea, leg swelling and PND. Gastrointestinal: Negative for abdominal pain, blood in stool, diarrhea, nausea and vomiting. Musculoskeletal: Negative for joint pain. Skin: Negative for rash. Neurological: Negative for dizziness, tingling and loss of consciousness. Endo/Heme/Allergies: Does not bruise/bleed easily. Physical Exam:   
 
General: Well developed, in no acute distress, cooperative and alert HEENT: No carotid bruits, no JVD, trach is midline. Neck Supple, PEERL, EOM intact. Heart:  reg rate and rhythm; normal S1/S2; no murmurs, gallops or rubs. Respiratory: Clear bilaterally x 4, no wheezing or rales Abdomen:   Soft, non-tender, no distention, no masses. + BS. Extremities:  Normal cap refill, no cyanosis, atraumatic. No edema. Neuro: A&Ox3, speech clear, gait stable. Skin: Skin color is normal. No rashes or lesions. Non diaphoretic Vascular: 2+ pulses symmetric in all extremities Vitals:  
 05/09/19 1058 05/09/19 1059 BP: 140/70 140/60 Pulse: 60 Resp: 16 SpO2: 97% Weight: 159 lb 14.4 oz (72.5 kg) Height: 5' 1\" (1.549 m) Cardiographics ECG: sinus rhythm Results for orders placed or performed in visit on 07/13/15 CARDIAC HOLTER MONITOR, 24 HOURS Narrative ECG Monitor/24 hours, Complete Reason for Holter Monitor   A-fib Heartbeat Slowest 57 Average 69 Fastest  89 Results:  
Underlying Rhythm: Normal sinus rhythm Atrial Arrhythmias: none AV Conduction: normal 
 
Ventricular Arrhythmias: premature ventricular contractions; 
occasional  
 
ST Segment Analysis:non-specific changes Symptom Correlation: No symptoms reported. Comments: 
No evidence of atrial fibrillation. Barbara Hernandez MD   
 
  
Results for orders placed or performed during the hospital encounter of 08/31/12 EKG, 12 LEAD, INITIAL Result Value Ref Range Ventricular Rate 57 BPM  
 Atrial Rate 57 BPM  
 P-R Interval 190 ms QRS Duration 86 ms  
 Q-T Interval 412 ms QTC Calculation (Bezet) 401 ms Calculated P Axis 25 degrees Calculated R Axis 38 degrees Calculated T Axis 44 degrees Diagnosis Sinus bradycardia When compared with ECG of 21-MAY-2011 12:31, No significant change was found Confirmed by Eduarda Richardson M.D., Caden Elizondo ([id number]) on 8/31/2012 5:22:59 PM  
 
 
Cardiology Labs: 
Lab Results Component Value Date/Time Cholesterol, total 164 10/11/2018 08:46 AM  
 HDL Cholesterol 57 10/11/2018 08:46 AM  
 LDL, calculated 86 10/11/2018 08:46 AM  
 Triglyceride 103 10/11/2018 08:46 AM  
 
 
Lab Results Component Value Date/Time Sodium 143 10/11/2018 08:46 AM  
 Potassium 4.4 10/11/2018 08:46 AM  
 Chloride 101 10/11/2018 08:46 AM  
 CO2 27 10/11/2018 08:46 AM  
 Anion gap 6 05/12/2018 10:17 AM  
 Glucose 99 10/11/2018 08:46 AM  
 BUN 16 10/11/2018 08:46 AM  
 Creatinine 0.73 10/11/2018 08:46 AM  
 BUN/Creatinine ratio 22 10/11/2018 08:46 AM  
 GFR est AA 93 10/11/2018 08:46 AM  
 GFR est non-AA 81 10/11/2018 08:46 AM  
 Calcium 9.8 10/11/2018 08:46 AM  
 Bilirubin, total 0.3 10/11/2018 08:46 AM  
 AST (SGOT) 24 10/11/2018 08:46 AM  
 Alk. phosphatase 68 10/11/2018 08:46 AM  
 Protein, total 7.1 10/11/2018 08:46 AM  
 Albumin 4.4 10/11/2018 08:46 AM  
 Globulin 4.3 (H) 05/12/2018 10:17 AM  
 A-G Ratio 1.6 10/11/2018 08:46 AM  
 ALT (SGPT) 22 10/11/2018 08:46 AM  
  
 
Orders Placed This Encounter  AMB POC EKG ROUTINE W/ 12 LEADS, INTER & REP Order Specific Question:   Reason for Exam: Answer:   routine Assessment: 
 
 Assessment: ICD-10-CM ICD-9-CM 1. Precordial pain R07.2 786.51 ECHO STRESS 2. Paroxysmal atrial fibrillation (HCC) I48.0 427.31 AMB POC EKG ROUTINE W/ 12 LEADS, INTER & REP 3. Essential hypertension, benign I10 401.1 4. Mixed hyperlipidemia E78.2 272.2 Plan: 1. Precordial pain Symptoms suspicious for ischemia Will obtain stress echo for further evaluation 2. Paroxysmal atrial fibrillation (HCC) No recurrence; previous event monitor shows no recurrence of A fib Continue Toprol - AMB POC EKG ROUTINE W/ 12 LEADS, INTER & REP 3. Essential hypertension, benign BP optimal 
Continue present anti-hypertensive therapy and low sodium diet 4. Mixed hyperlipidemia Continue zetia and statin therapy LDL 86 in 8/2018 Lipids managed by PCP 5. Suspected GERD: 
Still has indigestion relieved by antacids despite PPI.   Advised to call for follow-up with her gastroenterologist. 
 
Deja Duran on diet and exercise- eventual goal of 30-60 minutes 5-7 times a week as per AHA guidelines. Can intensify exercise after we verify stress echo is okay. Follow up in 6 months if testing normal and no progressive symptoms after gradual increase exercise and improved diet.    
 
Jinny Stallings MD

## 2019-05-09 NOTE — LETTER
5/9/19 Patient: Ferdinand Lia YOB: 1945 Date of Visit: 5/9/2019 [place] 
[address] [address] [address] [place] [address] VIA Facsimile: [phone] Dear Dorethea Hamman, MD, Thank you for referring Ms. Nj Atkinson to [facility] for evaluation. My notes for this consultation are attached. If you have questions, please do not hesitate to call me. I look forward to following your patient along with you.  
 
 
Sincerely, 
 
Paul Ramirez MD

## 2019-05-09 NOTE — PROGRESS NOTES
1. Have you been to the ER, urgent care clinic since your last visit? Hospitalized since your last visit? Yes 5/2018 Gastroenteritis, acute 2. Have you seen or consulted any other health care providers outside of the [address] [place] [address] since your last visit? Include any pap smears or colon screening. Yes GI 12/2018 Chief Complaint Patient presents with  Follow-up  Irregular Heart Beat Patient C/O palpitations C/O chest discomfort radiating to back with anxiety episodes. She questions if needs to continue ASA?

## 2019-05-23 ENCOUNTER — HOSPITAL ENCOUNTER (OUTPATIENT)
Dept: CT IMAGING | Age: 74
Discharge: HOME | End: 2019-05-23
Payer: SELF-PAY

## 2019-05-23 ENCOUNTER — TELEPHONE (OUTPATIENT)
Dept: CARDIOLOGY CLINIC | Age: 74
End: 2019-05-23

## 2019-05-23 DIAGNOSIS — Z00.00 PREVENTATIVE HEALTH CARE: ICD-10-CM

## 2019-05-23 PROCEDURE — 75571 CT HRT W/O DYE W/CA TEST: CPT

## 2019-05-23 NOTE — TELEPHONE ENCOUNTER
----- Message from Bharath Raphael NP sent at 5/23/2019  9:17 AM EDT -----  Kaitlynn Echeverria,    Please call patient and inform that stress test was negative; low concern for blocked arteries of the heart at this time.     Thanks,  [employer]

## 2019-05-24 ENCOUNTER — TELEPHONE (OUTPATIENT)
Dept: CARDIOLOGY CLINIC | Age: 74
End: 2019-05-24

## 2019-05-24 NOTE — PROGRESS NOTES
Anna,    Please call patient and inform her coronary calcium score is low, 91. This suggests just mild coronary disease. Given normal stress test, would recommend focusing on diet and exercise to reduce further risk for heart disease.     Thanks,  [employer]

## 2019-05-24 NOTE — TELEPHONE ENCOUNTER
----- Message from Elaina Jimenez NP sent at 5/24/2019  8:32 AM EDT -----  Ellyn Lozoya,    Please call patient and inform her coronary calcium score is low, 91. This suggests just mild coronary disease. Given normal stress test, would recommend focusing on diet and exercise to reduce further risk for heart disease.     Thanks,  [employer]

## 2019-05-28 ENCOUNTER — TELEPHONE (OUTPATIENT)
Dept: CARDIOLOGY CLINIC | Age: 74
End: 2019-05-28

## 2019-06-21 ENCOUNTER — TELEPHONE (OUTPATIENT)
Dept: CARDIOLOGY CLINIC | Age: 74
End: 2019-06-21

## 2019-06-21 NOTE — TELEPHONE ENCOUNTER
Pt had the Calcium Score test done May 24 and is asking if Dr. María Ledezma can get her results because when she calls for them she is being told to call here. Thanks!

## 2019-10-09 DIAGNOSIS — I48.0 PAROXYSMAL ATRIAL FIBRILLATION (HCC): ICD-10-CM

## 2019-10-09 DIAGNOSIS — R07.2 PRECORDIAL PAIN: ICD-10-CM

## 2019-10-09 DIAGNOSIS — I10 ESSENTIAL HYPERTENSION, BENIGN: ICD-10-CM

## 2019-10-09 DIAGNOSIS — E78.2 MIXED HYPERLIPIDEMIA: ICD-10-CM

## 2019-11-06 ENCOUNTER — OFFICE VISIT (OUTPATIENT)
Dept: CARDIOLOGY CLINIC | Age: 74
End: 2019-11-06

## 2019-11-06 VITALS
BODY MASS INDEX: 30.26 KG/M2 | HEIGHT: 61 IN | OXYGEN SATURATION: 97 % | DIASTOLIC BLOOD PRESSURE: 70 MMHG | WEIGHT: 160.3 LBS | SYSTOLIC BLOOD PRESSURE: 130 MMHG | RESPIRATION RATE: 16 BRPM | HEART RATE: 86 BPM

## 2019-11-06 DIAGNOSIS — I10 ESSENTIAL HYPERTENSION, BENIGN: ICD-10-CM

## 2019-11-06 DIAGNOSIS — I48.0 PAF (PAROXYSMAL ATRIAL FIBRILLATION) (HCC): ICD-10-CM

## 2019-11-06 DIAGNOSIS — I48.0 PAROXYSMAL ATRIAL FIBRILLATION (HCC): Primary | ICD-10-CM

## 2019-11-06 DIAGNOSIS — E78.2 MIXED HYPERLIPIDEMIA: ICD-10-CM

## 2019-11-06 DIAGNOSIS — G47.33 OBSTRUCTIVE SLEEP APNEA (ADULT) (PEDIATRIC): ICD-10-CM

## 2019-11-06 RX ORDER — CELECOXIB 200 MG/1
200 CAPSULE ORAL AS NEEDED
Refills: 11 | COMMUNITY
Start: 2019-08-30 | End: 2021-09-20

## 2019-11-06 NOTE — PROGRESS NOTES
[address] [place] [address], [place], [address] [facility]  [phone]     Subjective:      Bear Block is a 66 y.o. female is here for routine f/u. The patient denies chest pain/ shortness of breath, orthopnea, PND, LE edema, syncope, or presyncope. Rare and fleeting palpitations unchanged from prior negative event monitor. Patient Active Problem List    Diagnosis Date Noted    Irregular heartbeat 2018    PAF (paroxysmal atrial fibrillation) (HCC) 2017    Anemia 2016    Hematuria 2015    RLQ abdominal pain 08/15/2012    Obstructive sleep apnea (adult) (pediatric) 2012    History of emotional problems 2012    Mixed hyperlipidemia 2012    Essential hypertension, benign 2012    Atrial fibrillation ([facility] [address] 2012    Fibrocystic breast changes 2011    Arthritis     Palpitations 2011    Tick bite 2011      Libia Clark MD  Past Medical History:   Diagnosis Date    Anxiety     Arrhythmia     palpitations    Arthritis     Diverticulosis     Fibrocystic breast changes 2011    GERD (gastroesophageal reflux disease)     Hiatal hernia     High cholesterol     Hypertension     Migraine     MARIUSZ (obstructive sleep apnea) 2009    AHI: 15 per hour    Other ill-defined conditions(799.89)     VULVA-BURNING    Unspecified sleep apnea     uses c-pap      Past Surgical History:   Procedure Laterality Date    BREAST SURGERY PROCEDURE UNLISTED      left breast lumpectomy    HX APPENDECTOMY      HX CHOLECYSTECTOMY      HX GYN       x 3, hysterectomy    HX ORTHOPAEDIC      L knee scope    HX ORTHOPAEDIC      left foot bunionectomy    HX RECTOCELE REPAIR      HX UROLOGICAL      BLADDER TACKING     Allergies   Allergen Reactions    Latex Itching    Adhesive Tape-Silicones Other (comments)     Causes skin to turn red    Morphine Other (comments)     States makes her breathing too slow.     Codeine Other (comments)     abd pain      Crestor [Rosuvastatin] Itching    Dicyclomine Other (comments)     Chest pain     Iodine And Iodide Containing Products Rash     Caused itching and a rash    Lipitor [Atorvastatin] Myalgia     Joint pain      Olux [Clobetasol] Rash    Other Medication Rash     Itch Relief Mositurizing Lotion    Pcn [Penicillins] Hives      History reviewed. No pertinent family history. Social History     Socioeconomic History    Marital status:      Spouse name: Not on file    Number of children: Not on file    Years of education: Not on file    Highest education level: Not on file   Occupational History    Not on file   Social Needs    Financial resource strain: Not on file    Food insecurity:     Worry: Not on file     Inability: Not on file    Transportation needs:     Medical: Not on file     Non-medical: Not on file   Tobacco Use    Smoking status: Never Smoker    Smokeless tobacco: Never Used   Substance and Sexual Activity    Alcohol use: No    Drug use: No    Sexual activity: Yes     Partners: Male   Lifestyle    Physical activity:     Days per week: Not on file     Minutes per session: Not on file    Stress: Not on file   Relationships    Social connections:     Talks on phone: Not on file     Gets together: Not on file     Attends Yazidism service: Not on file     Active member of club or organization: Not on file     Attends meetings of clubs or organizations: Not on file     Relationship status: Not on file    Intimate partner violence:     Fear of current or ex partner: Not on file     Emotionally abused: Not on file     Physically abused: Not on file     Forced sexual activity: Not on file   Other Topics Concern    Not on file   Social History Narrative    Not on file      Current Outpatient Medications   Medication Sig    celecoxib (CELEBREX) 200 mg capsule Take 200 mg by mouth as needed.     amLODIPine (NORVASC) 5 mg tablet TAKE ONE TABLET BY MOUTH ONCE DAILY    ezetimibe (ZETIA) 10 mg tablet Take 1 Tab by mouth daily. TAKE ONE TABLET BY MOUTH ONCE DAILY    hydroCHLOROthiazide (HYDRODIURIL) 25 mg tablet TAKE ONE TABLET BY MOUTH ONCE DAILY    metoprolol succinate (TOPROL-XL) 50 mg XL tablet TAKE ONE TABLET BY MOUTH ONCE DAILY    pitavastatin calcium (LIVALO) 4 mg tab tablet TAKE ONE TABLET BY MOUTH ONCE DAILY    pantoprazole (PROTONIX) 40 mg tablet     cpap machine kit by Does Not Apply route.  BIOTIN PO Take 5,000 mcg by mouth daily.  FLAXSEED OIL PO Take 1,000 mg by mouth daily.  aspirin delayed-release (ASPIR-LOW) 81 mg tablet Take 81 mg by mouth daily.  estradiol (ESTRACE) 1 mg tablet Take 1 mg by mouth as needed. Three times weekly    naproxen (NAPROSYN) 500 mg tablet Take 500 mg by mouth two (2) times daily as needed. No current facility-administered medications for this visit. Review of Symptoms:  11 systems reviewed, negative other than as stated in the HPI    Physical ExamPhysical Exam:    Vitals:    11/06/19 1404   BP: 130/70   Pulse: 86   Resp: 16   SpO2: 97%   Weight: 160 lb 4.8 oz (72.7 kg)   Height: 5' 1\" (1.549 m)     Body mass index is 30.29 kg/m². General PE  Gen:  NAD  Mental Status - Alert. General Appearance - Not in acute distress. HEENT:  PERRL, no carotid bruits or JVD  Chest and Lung Exam   Inspection: Accessory muscles - No use of accessory muscles in breathing. Auscultation:   Breath sounds: - Normal.   Cardiovascular   Inspection: Jugular vein - Bilateral - Inspection Normal.   Palpation/Percussion:   Apical Impulse: - Normal.   Auscultation: Rhythm - Regular. Heart Sounds - S1 WNL and S2 WNL. No S3 or S4. Murmurs & Other Heart Sounds: Auscultation of the heart reveals - No Murmurs. Peripheral Vascular   Upper Extremity: Inspection - Bilateral - No Cyanotic nailbeds or Digital clubbing. Lower Extremity:   Palpation: Edema - Bilateral - No edema.   Abdomen:   Soft, non-tender, bowel sounds are active. Neuro: A&O times 3, CN and motor grossly WNL    Labs:   Lab Results   Component Value Date/Time    Cholesterol, total 164 10/11/2018 08:46 AM    Cholesterol, total 176 12/11/2017 11:43 AM    Cholesterol, total 210 (H) 12/06/2016 01:05 PM    Cholesterol, total 167 05/16/2016 12:07 PM    Cholesterol, total 165 06/25/2015 12:27 PM    HDL Cholesterol 57 10/11/2018 08:46 AM    HDL Cholesterol 49 12/11/2017 11:43 AM    HDL Cholesterol 62 12/06/2016 01:05 PM    HDL Cholesterol 54 05/16/2016 12:07 PM    HDL Cholesterol 54 06/25/2015 12:27 PM    LDL, calculated 86 10/11/2018 08:46 AM    LDL, calculated 96 12/11/2017 11:43 AM    LDL, calculated 114 (H) 12/06/2016 01:05 PM    LDL, calculated 72 05/16/2016 12:07 PM    LDL, calculated 70 06/25/2015 12:27 PM    Triglyceride 103 10/11/2018 08:46 AM    Triglyceride 154 (H) 12/11/2017 11:43 AM    Triglyceride 171 (H) 12/06/2016 01:05 PM    Triglyceride 206 (H) 05/16/2016 12:07 PM    Triglyceride 207 (H) 06/25/2015 12:27 PM     Lab Results   Component Value Date/Time     (H) 05/21/2011 01:00 PM     Lab Results   Component Value Date/Time    Sodium 143 10/11/2018 08:46 AM    Potassium 4.4 10/11/2018 08:46 AM    Chloride 101 10/11/2018 08:46 AM    CO2 27 10/11/2018 08:46 AM    Anion gap 6 05/12/2018 10:17 AM    Glucose 99 10/11/2018 08:46 AM    BUN 16 10/11/2018 08:46 AM    Creatinine 0.73 10/11/2018 08:46 AM    BUN/Creatinine ratio 22 10/11/2018 08:46 AM    GFR est AA 93 10/11/2018 08:46 AM    GFR est non-AA 81 10/11/2018 08:46 AM    Calcium 9.8 10/11/2018 08:46 AM    Bilirubin, total 0.3 10/11/2018 08:46 AM    AST (SGOT) 24 10/11/2018 08:46 AM    Alk. phosphatase 68 10/11/2018 08:46 AM    Protein, total 7.1 10/11/2018 08:46 AM    Albumin 4.4 10/11/2018 08:46 AM    Globulin 4.3 (H) 05/12/2018 10:17 AM    A-G Ratio 1.6 10/11/2018 08:46 AM    ALT (SGPT) 22 10/11/2018 08:46 AM       EKG:  NSR     Assessment:      1. Paroxysmal atrial fibrillation (HCC)    2. Essential hypertension, benign    3. Mixed hyperlipidemia    4. PAF (paroxysmal atrial fibrillation) ([address]    5. Obstructive sleep apnea (adult) (pediatric)        Orders Placed This Encounter    CK    LIPID PANEL    METABOLIC PANEL, COMPREHENSIVE    AMB POC EKG ROUTINE W/ 12 LEADS, INTER & REP     Order Specific Question:   Reason for Exam:     Answer:   routine    celecoxib (CELEBREX) 200 mg capsule     Sig: Take 200 mg by mouth as needed. Refill:  11        Plan:     1. Precordial pain resolved  Stress echo negative 5/2019. She now attributes this to resolved GERD, on Protonix.     2. Paroxysmal atrial fibrillation (HCC)  No recurrence; previous event monitor shows no recurrence of A fib 5/2018  Continue Toprol  If she has more than occasional and fleeting palpitations lasting a second or 2, she will call for an event monitor  - AMB POC EKG ROUTINE W/ 12 LEADS, INTER & REP     3. Essential hypertension, benign  BP optimal  Continue present anti-hypertensive therapy and low sodium diet     4. Mixed hyperlipidemia  Continue zetia and statin therapy  LDL 86 in 8/2018  Lipids are due at this time-lab slip provided. In the future, she may choose to get her labs and her statin from her PCP if she wishes.     5. Suspected GERD:  On Protonix     Counseled on diet and exercise- eventual goal of 30-60 minutes 5-7 times a week as per AHA guidelines.     She says she is already very active with housework cooking etc.    Follow up in 6 months    Candy Vasquez MD

## 2019-11-06 NOTE — LETTER
11/6/19 Patient: Brooklyn Fernandes YOB: 1945 Date of Visit: 11/6/2019 Kranthi Rodríguez MD 
[address] [address] [address] [place] [address] VIA Facsimile: [phone] Dear Kranthi Rodríguez MD, Thank you for referring Ms. Pattie Lozada to [facility] for evaluation. My notes for this consultation are attached. If you have questions, please do not hesitate to call me. I look forward to following your patient along with you.  
 
 
Sincerely, 
 
Hodan Patricia MD

## 2019-11-06 NOTE — PROGRESS NOTES
1. Have you been to the ER, urgent care clinic since your last visit? Hospitalized since your last visit? No    2. Have you seen or consulted any other health care providers outside of the [address] [place] [address] since your last visit? Include any pap smears or colon screening. No    Chief Complaint   Patient presents with    Follow-up    Irregular Heart Beat     Patient C/O palpitations with caffeine and stress. Yoana Cruz

## 2019-12-03 ENCOUNTER — OFFICE VISIT (OUTPATIENT)
Dept: SLEEP MEDICINE | Age: 74
End: 2019-12-03

## 2019-12-03 VITALS
OXYGEN SATURATION: 97 % | DIASTOLIC BLOOD PRESSURE: 67 MMHG | HEIGHT: 61 IN | TEMPERATURE: 97.1 F | HEART RATE: 72 BPM | SYSTOLIC BLOOD PRESSURE: 118 MMHG | BODY MASS INDEX: 29.91 KG/M2 | WEIGHT: 158.4 LBS

## 2019-12-03 DIAGNOSIS — I10 ESSENTIAL HYPERTENSION: ICD-10-CM

## 2019-12-03 DIAGNOSIS — G47.33 OSA (OBSTRUCTIVE SLEEP APNEA): Primary | ICD-10-CM

## 2019-12-03 DIAGNOSIS — Z86.59 H/O ANXIETY DISORDER: ICD-10-CM

## 2019-12-03 NOTE — PATIENT INSTRUCTIONS
[address] [facility] [place]. [place], [address]  Tel.  [phone]  Fax. [address] [facility] [address]  [place], [address] [facility]  Tel.  [phone]  Fax. [phone] [address]  Tel.  [phone]  Fax. [phone]     Learning About CPAP for Sleep Apnea  What is CPAP? CPAP is a small machine that you use at home every night while you sleep. It increases air pressure in your throat to keep your airway open. When you have sleep apnea, this can help you sleep better so you feel much better. CPAP stands for \"continuous positive airway pressure. \"  The CPAP machine will have one of the following:  · A mask that covers your nose and mouth  · Prongs that fit into your nose  · A mask that covers your nose only, the most common type. This type is called NCPAP. The N stands for \"nasal.\"  Why is it done? CPAP is usually the best treatment for obstructive sleep apnea. It is the first treatment choice and the most widely used. Your doctor may suggest CPAP if you have:  · Moderate to severe sleep apnea. · Sleep apnea and coronary artery disease (CAD) or heart failure. How does it help? · CPAP can help you have more normal sleep, so you feel less sleepy and more alert during the daytime. · CPAP may help keep heart failure or other heart problems from getting worse. · NCPAP may help lower your blood pressure. · If you use CPAP, your bed partner may also sleep better because you are not snoring or restless. What are the side effects? Some people who use CPAP have:  · A dry or stuffy nose and a sore throat. · Irritated skin on the face. · Sore eyes. · Bloating. If you have any of these problems, work with your doctor to fix them. Here are some things you can try:  · Be sure the mask or nasal prongs fit well. · See if your doctor can adjust the pressure of your CPAP. · If your nose is dry, try a humidifier.   · If your nose is runny or stuffy, try decongestant medicine or a steroid nasal spray. If these things do not help, you might try a different type of machine. Some machines have air pressure that adjusts on its own. Others have air pressures that are different when you breathe in than when you breathe out. This may reduce discomfort caused by too much pressure in your nose. Where can you learn more? Go to [employer]  Enter [employer] Search in the search box to learn more about \"Learning About CPAP for Sleep Apnea. \"   © 7695-7460 Healthwise, Incorporated. Care instructions adapted under license by [facility] [employer] (which disclaims liability or warranty for this information). This care instruction is for use with your licensed healthcare professional. If you have questions about a medical condition or this instruction, always ask your healthcare professional. [address] any warranty or liability for your use of this information. Content Version: 8.7.66232; Last Revised: January 11, 2010  PROPER SLEEP HYGIENE    What to avoid  · Do not have drinks with caffeine, such as coffee or black tea, for 8 hours before bed. · Do not smoke or use other types of tobacco near bedtime. Nicotine is a stimulant and can keep you awake. · Avoid drinking alcohol late in the evening, because it can cause you to wake in the middle of the night. · Do not eat a big meal close to bedtime. If you are hungry, eat a light snack. · Do not drink a lot of water close to bedtime, because the need to urinate may wake you up during the night. · Do not read or watch TV in bed. Use the bed only for sleeping and sexual activity. What to try  · Go to bed at the same time every night, and wake up at the same time every morning. Do not take naps during the day. · Keep your bedroom quiet, dark, and cool. · Get regular exercise, but not within 3 to 4 hours of your bedtime. .  · Sleep on a comfortable pillow and mattress.   · If watching the clock makes you anxious, turn it facing away from you so you cannot see the time. · If you worry when you lie down, start a worry book. Well before bedtime, write down your worries, and then set the book and your concerns aside. · Try meditation or other relaxation techniques before you go to bed. · If you cannot fall asleep, get up and go to another room until you feel sleepy. Do something relaxing. Repeat your bedtime routine before you go to bed again. · Make your house quiet and calm about an hour before bedtime. Turn down the lights, turn off the TV, log off the computer, and turn down the volume on music. This can help you relax after a busy day. Drowsy Driving: The [facility] cites drowsiness as a causing factor in more than 052,776 police reported crashes annually, resulting in 76,000 injuries and 1,500 deaths. Other surveys suggest 55% of people polled have driven while drowsy in the past year, 23% had fallen asleep but not crashed, 3% crashed, and 2% had and accident due to drowsy driving. Who is at risk? Young Drivers: One study of drowsy driving accidents states that 55% of the drivers were under 25 years. Of those, 75% were male. Shift Workers and Travelers: People who work overnight or travel across time zones frequently are at higher risk of experiencing Circadian Rhythm Disorders. They are trying to work and function when their body is programed to sleep. Sleep Deprived: Lack of sleep has a serious impact on your ability to pay attention or focus on a task. Consistently getting less than the average of 8 hours your body needs creates partial or cumulative sleep deprivation. Untreated Sleep Disorders: Sleep Apnea, Narcolepsy, R.L.S., and other sleep disorders (untreated) prevent a person from getting enough restful sleep. This leads to excessive daytime sleepiness and increases the risk for drowsy driving accidents by up to 7 times.   Medications / Alcohol: Even over the counter medications can cause drowsiness. Medications that impair a drivers attention should have a warning label. Alcohol naturally makes you sleepy and on its own can cause accidents. Combined with excessive drowsiness its effects are amplified. Signs of Drowsy Driving:   * You don't remember driving the last few miles   * You may drift out of your arnold   * You are unable to focus and your thoughts wander   * You may yawn more often than normal   * You have difficulty keeping your eyes open / nodding off   * Missing traffic signs, speeding, or tailgating  Prevention-   Good sleep hygiene, lifestyle and behavioral choices have the most impact on drowsy driving. There is no substitute for sleep and the average person requires 8 hours nightly. If you find yourself driving drowsy, stop and sleep. Consider the sleep hygiene tips provided during your visit as well. Medication Refill Policy: Refills for all medications require 1 week advance notice. Please have your pharmacy fax a refill request. We are unable to fax, or call in \"controled substance\" medications and you will need to pick these prescriptions up from our office. [employer] Activation    Thank you for requesting access to [employer]. Please follow the instructions below to securely access and download your online medical record. [employer] allows you to send messages to your doctor, view your test results, renew your prescriptions, schedule appointments, and more. How Do I Sign Up? 1. In your internet browser, go to [email or web address]. 2. Click on the First Time User? Click Here link in the Sign In box. You will see the New Member Sign Up page. 3. Enter your [employer] Access Code exactly as it appears below. You will not need to use this code after youve completed the sign-up process. If you do not sign up before the expiration date, you must request a new code.     [employer] Access Code: [email or web address]  Expires: 2019  2:43 PM (This is the date your [employer] access code will )    4. Enter the last four digits of your Social Security Number (xxxx) and Date of Birth (mm/dd/yyyy) as indicated and click Submit. You will be taken to the next sign-up page. 5. Create a [employer] ID. This will be your [employer] login ID and cannot be changed, so think of one that is secure and easy to remember. 6. Create a [employer] password. You can change your password at any time. 7. Enter your Password Reset Question and Answer. This can be used at a later time if you forget your password. 8. Enter your e-mail address. You will receive e-mail notification when new information is available in [address] 9. Click Sign Up. You can now view and download portions of your medical record. 10. Click the Download Summary menu link to download a portable copy of your medical information. Additional Information    If you have questions, please call [phone]. Remember, [employer] is NOT to be used for urgent needs. For medical emergencies, dial 911.

## 2019-12-03 NOTE — PROGRESS NOTES
217 [facility] [place]. [place], [address]  Tel.  [phone]  Fax. 100 [facility] 60  [place], [address] [facility]  Tel.  [phone]  Fax. [phone] [address]   Tel.  [phone]  Fax. [phone]     S>Ada REID Angie Azar is a 66 y.o. female seen for a positive airway pressure follow-up. She reports no problems using the device. She is 90% compliant over the past 30 days. The following problems are identified:    Drowsiness no Problems exhaling no   Snoring no Forget to put on no   Mask Comfortable yes Can't fall asleep no   Dry Mouth no Mask falls off no   Air Leaking no Frequent awakenings no         She admits that her sleep has improved on PAP therapy using nasal pillows mask and non-heated tubing. Allergies   Allergen Reactions    Latex Itching    Adhesive Tape-Silicones Other (comments)     Causes skin to turn red    Morphine Other (comments)     States makes her breathing too slow.  Codeine Other (comments)     abd pain      Crestor [Rosuvastatin] Itching    Dicyclomine Other (comments)     Chest pain     Iodine And Iodide Containing Products Rash     Caused itching and a rash    Lipitor [Atorvastatin] Myalgia     Joint pain      Olux [Clobetasol] Rash    Other Medication Rash     Itch Relief Mositurizing Lotion    Pcn [Penicillins] Hives       She has a current medication list which includes the following prescription(s): celecoxib, amlodipine, ezetimibe, hydrochlorothiazide, metoprolol succinate, pitavastatin calcium, pantoprazole, cpap machine, biotin, flaxseed oil, aspirin delayed-release, estradiol, and naproxen. Kevin Jung       She  has a past medical history of Anxiety, Arrhythmia, Arthritis, Diverticulosis, Fibrocystic breast changes (7/11/2011), GERD (gastroesophageal reflux disease), Hiatal hernia, High cholesterol, Hypertension, Migraine, MARIUSZ (obstructive sleep apnea) (04-), Other ill-defined conditions(839.89), and Unspecified sleep apnea. [place] Sleepiness Score: 0   and Modified F.O.S.Q. Score Total / 2: 20   which reflect improved sleep quality over therapy time. O>    Visit Vitals  /67   Pulse 72   Temp 97.1 °F (36.2 °C)   Ht 5' 1\" (1.549 m)   Wt 158 lb 6.4 oz (71.8 kg)   SpO2 97%   BMI 29.93 kg/m²         General:   Not in acute distress   Eyes:  Anicteric sclerae, no obvious strabismus   Nose:  No obvious nasal septum deviation    Oropharynx:   Class 4 oropharyngeal outlet, thick tongue base, uvula not seen due to low-lying soft palate, narrow tonsilo-pharyngeal pilars   Tonsils:   tonsils are not visualized due to low-lying soft palate   Neck:   midline trachea   Chest/Lungs:  Equal lung expansion, clear on auscultation    CVS:  Normal rate, regular rhythm; no JVD   Skin:  Warm to touch; no obvious rashes   Neuro:  No focal deficits ; no obvious tremor    Psych:  Normal affect,  normal countenance;           A>    ICD-10-CM ICD-9-CM    1. MARIUSZ (obstructive sleep apnea) G47.33 327.23 POLYSOMNOGRAPHY 1 NIGHT   2. Essential hypertension I10 401.9    3. H/O anxiety disorder Z86.59 V11.8    4. BMI 27.0-27.9,adult Z68.27 V85.23      AHI = 15. On CPAP :  6 cmH2O. Compliant:      yes    Therapeutic Response:  Positive    P>    * Patient agrees to testing prior to prescription of oral appliance therapy Minoo Petty DDS). Orders Placed This Encounter    POLYSOMNOGRAPHY 1 NIGHT     Standing Status:   Future     Standing Expiration Date:   12/3/2020     Order Specific Question:   Reason for Exam     Answer:   MRAIUSZ       * We have recommended a dedicated weight loss through appropriate diet and an exercise regiment as significant weight reduction has been shown to reduce severity of obstructive sleep apnea. *   Follow-up and Dispositions    · Return in about 1 year (around 12/3/2020), or if symptoms worsen or fail to improve.          * She was asked to contact our office for any problems regarding PAP therapy. * Counseling was provided regarding the importance of regular PAP use and on proper sleep hygiene and safe driving. * Re-enforced proper and regular cleaning for the device. Thank you for allowing us to participate in your patient's medical care. Juan Hall MD, FAASM  Electronically signed.  12/03/19

## 2019-12-04 ENCOUNTER — DOCUMENTATION ONLY (OUTPATIENT)
Dept: SLEEP MEDICINE | Age: 74
End: 2019-12-04

## 2020-04-01 ENCOUNTER — TELEPHONE (OUTPATIENT)
Dept: SLEEP MEDICINE | Age: 75
End: 2020-04-01

## 2020-04-08 ENCOUNTER — TELEPHONE (OUTPATIENT)
Dept: SLEEP MEDICINE | Age: 75
End: 2020-04-08

## 2020-04-08 NOTE — TELEPHONE ENCOUNTER
Patient called to inform us that she will contact us after COVID-19 to reschedule sleep study she does not want to reschedule at this time.

## 2020-04-21 NOTE — PROGRESS NOTES
Florentino Rodriguez is a 66 y.o. female who was seen by synchronous (real-time) audio-video technology on 2020     [facility] [address]  [phone]     Subjective:      Florentino Rodriguez is a 66 y.o. female is here for routine f/u. The patient denies chest pain/ shortness of breath, orthopnea, PND, LE edema, palpitations, syncope, or presyncope. Very active, walking 2 miles a day and gardening frequently. She worries a lot, but no cardiac symptoms.     Patient Active Problem List    Diagnosis Date Noted    Irregular heartbeat 2018    PAF ([facility]oxysmal atrial fibrillation) ([facility] 2017    Anemia 2016    Hematuria 2015    RLQ abdominal pain 08/15/2012    Obstructive sleep apnea (adult) (pediatric) 2012    History of emotional problems 2012    Mixed hyperlipidemia 2012    Essential hypertension, benign 2012    Atrial fibrillation ([facility] 2012    Fibrocystic breast changes 2011    Arthritis     Palpitations 2011    Tick bite 2011      Joanie North MD  Past Medical History:   Diagnosis Date    Anxiety     Arrhythmia     palpitations    Arthritis     Diverticulosis     Fibrocystic breast changes 2011    GERD (gastroesophageal reflux disease)     Hiatal hernia     High cholesterol     Hypertension     Migraine     MARIUSZ (obstructive sleep apnea) 2009    AHI: 15 per hour    Other ill-defined conditions(799.89)     VULVA-BURNING    Unspecified sleep apnea     uses c-pap      Past Surgical History:   Procedure Laterality Date    BREAST SURGERY PROCEDURE UNLISTED      left breast lumpectomy    HX APPENDECTOMY      HX CHOLECYSTECTOMY      HX GYN       x 3, hysterectomy    HX ORTHOPAEDIC      L knee scope    HX ORTHOPAEDIC      left foot bunionectomy    HX RECTOCELE REPAIR      HX UROLOGICAL      BLADDER TACKING     Allergies   Allergen Reactions    Latex Itching    Adhesive Tape-Silicones Other (comments)     Causes skin to turn red    Morphine Other (comments)     States makes her breathing too slow.  Codeine Other (comments)     abd pain      Crestor [Rosuvastatin] Itching    Dicyclomine Other (comments)     Chest pain     Iodine And Iodide Containing Products Rash     Caused itching and a rash    Lipitor [Atorvastatin] Myalgia     Joint pain      Olux [Clobetasol] Rash    Other Medication Rash     Itch Relief Mositurizing Lotion    Pcn [Penicillins] Hives      No family history on file.    Social History     Socioeconomic History    Marital status:      Spouse name: Not on file    Number of children: Not on file    Years of education: Not on file    Highest education level: Not on file   Occupational History    Not on file   Social Needs    Financial resource strain: Not on file    Food insecurity     Worry: Not on file     Inability: Not on file    Transportation needs     Medical: Not on file     Non-medical: Not on file   Tobacco Use    Smoking status: Never Smoker    Smokeless tobacco: Never Used   Substance and Sexual Activity    Alcohol use: No    Drug use: No    Sexual activity: Yes     Partners: Male   Lifestyle    Physical activity     Days per week: Not on file     Minutes per session: Not on file    Stress: Not on file   Relationships    Social connections     Talks on phone: Not on file     Gets together: Not on file     Attends Hinduism service: Not on file     Active member of club or organization: Not on file     Attends meetings of clubs or organizations: Not on file     Relationship status: Not on file    Intimate partner violence     Fear of current or ex partner: Not on file     Emotionally abused: Not on file     Physically abused: Not on file     Forced sexual activity: Not on file   Other Topics Concern    Not on file   Social History Narrative    Not on file      Current Outpatient Medications Medication Sig    docosahexaenoic acid/epa (FISH OIL PO) Take  by mouth.  linaCLOtide (Linzess) 145 mcg cap capsule Take 145 mcg by mouth Daily (before breakfast).  amLODIPine (NORVASC) 5 mg tablet TAKE ONE TABLET BY MOUTH ONCE DAILY    ezetimibe (ZETIA) 10 mg tablet Take 1 Tab by mouth daily. TAKE ONE TABLET BY MOUTH ONCE DAILY    hydroCHLOROthiazide (HYDRODIURIL) 25 mg tablet TAKE ONE TABLET BY MOUTH ONCE DAILY    metoprolol succinate (TOPROL-XL) 50 mg XL tablet TAKE ONE TABLET BY MOUTH ONCE DAILY    pitavastatin calcium (LIVALO) 4 mg tab tablet TAKE ONE TABLET BY MOUTH ONCE DAILY    pantoprazole (PROTONIX) 40 mg tablet     cpap machine kit by Does Not Apply route.  aspirin delayed-release (ASPIR-LOW) 81 mg tablet Take 81 mg by mouth daily.  naproxen (NAPROSYN) 500 mg tablet Take 500 mg by mouth two (2) times daily as needed.  celecoxib (CELEBREX) 200 mg capsule Take 200 mg by mouth as needed.  BIOTIN PO Take 5,000 mcg by mouth daily.  FLAXSEED OIL PO Take 1,000 mg by mouth daily.  estradiol (ESTRACE) 1 mg tablet Take 1 mg by mouth as needed. Three times weekly     No current facility-administered medications for this visit. Review of Symptoms:  11 systems reviewed, negative other than as stated in the HPI    Physical Exam:    Vitals:    04/22/20 1054   BP: 139/86   Weight: 156 lb (70.8 kg)   Height: 5' 1\" (1.549 m)     See patient reported vital signs in nursing note as applicable. /86  Body mass index is 29.48 kg/m². General PE  Gen:  NAD  Mental Status - Alert. General Appearance - Not in acute distress. HEENT:  PER, EOM, no JVD  Chest and Lung Exam   Inspection: Accessory muscles - No use of accessory muscles in breathing. No audible wheezing. Normal effort in breathing. Symmetric excursion. Cardiovascular:  No JVD  Upper Extremity: Inspection - Bilateral - No Cyanotic nailbeds or Digital clubbing.    Lower Extremity:   Palpation: Edema - Bilateral - No edema. Neuro: A&O times 3, CN and motor grossly WNL  Skin: no rashes or lesions on exposed skin, dry, intact  Psych: normal mood, affect. Speech clear. Labs:   Lab Results   Component Value Date/Time    Cholesterol, total 164 10/11/2018 08:46 AM    Cholesterol, total 176 12/11/2017 11:43 AM    Cholesterol, total 210 (H) 12/06/2016 01:05 PM    Cholesterol, total 167 05/16/2016 12:07 PM    Cholesterol, total 165 06/25/2015 12:27 PM    HDL Cholesterol 57 10/11/2018 08:46 AM    HDL Cholesterol 49 12/11/2017 11:43 AM    HDL Cholesterol 62 12/06/2016 01:05 PM    HDL Cholesterol 54 05/16/2016 12:07 PM    HDL Cholesterol 54 06/25/2015 12:27 PM    LDL, calculated 86 10/11/2018 08:46 AM    LDL, calculated 96 12/11/2017 11:43 AM    LDL, calculated 114 (H) 12/06/2016 01:05 PM    LDL, calculated 72 05/16/2016 12:07 PM    LDL, calculated 70 06/25/2015 12:27 PM    Triglyceride 103 10/11/2018 08:46 AM    Triglyceride 154 (H) 12/11/2017 11:43 AM    Triglyceride 171 (H) 12/06/2016 01:05 PM    Triglyceride 206 (H) 05/16/2016 12:07 PM    Triglyceride 207 (H) 06/25/2015 12:27 PM     Lab Results   Component Value Date/Time     (H) 05/21/2011 01:00 PM     Lab Results   Component Value Date/Time    Sodium 143 10/11/2018 08:46 AM    Potassium 4.4 10/11/2018 08:46 AM    Chloride 101 10/11/2018 08:46 AM    CO2 27 10/11/2018 08:46 AM    Anion gap 6 05/12/2018 10:17 AM    Glucose 99 10/11/2018 08:46 AM    BUN 16 10/11/2018 08:46 AM    Creatinine 0.73 10/11/2018 08:46 AM    BUN/Creatinine ratio 22 10/11/2018 08:46 AM    GFR est AA 93 10/11/2018 08:46 AM    GFR est non-AA 81 10/11/2018 08:46 AM    Calcium 9.8 10/11/2018 08:46 AM    Bilirubin, total 0.3 10/11/2018 08:46 AM    AST (SGOT) 24 10/11/2018 08:46 AM    Alk.  phosphatase 68 10/11/2018 08:46 AM    Protein, total 7.1 10/11/2018 08:46 AM    Albumin 4.4 10/11/2018 08:46 AM    Globulin 4.3 (H) 05/12/2018 10:17 AM    A-G Ratio 1.6 10/11/2018 08:46 AM    ALT (SGPT) 22 10/11/2018 08:46 AM          Assessment:      1. Paroxysmal atrial fibrillation (HCC)    2. Essential hypertension, benign    3. Mixed hyperlipidemia    4. MARIUSZ (obstructive sleep apnea)        Orders Placed This Encounter    docosahexaenoic acid/epa (FISH OIL PO)     Sig: Take  by mouth.  linaCLOtide (Linzess) 145 mcg cap capsule     Sig: Take 145 mcg by mouth Daily (before breakfast). Plan:     Patient presents for a telemedicine visit. Last seen in clinic 11/19. At that time she reported rare and fleeting palpitations unchanged from prior negative event monitor. She was also seen by Dr Miranda Pro with sleep 12/19, scheduled for repeat sleep study 4/2020 but was canceled due to Covid.       PAF  Normal EF mild MR per echo in 2014  Distant history of PAF, without recurrence in a long time  previous Holter monitor in 2015 and then event monitor May 2018 shows no recurrence of A fib   Low threshold to anticoagulate in the future if recurrent atrial fibrillation. Continue Toprol  If she has more than occasional and fleeting palpitations lasting a few seconds, she will call for an event monitor       HTN  Controlled with current therapy  Stable kidney fxn in 10/19 Cr 0.73       HLD  10/19 LDL at 64  Continue zetia and statin therapy  Will defer further statin refill to pcp who follows labs    MARIUSZ  On cpap therapy, followed by Dr Miranda Pro  For repeat sleep study when Covid pandemic resolves prior to prescription of oral appliance therapy Nelia Arambula DDS). Precordial pain, resolved  Stress echo negative 5/2019. She now attributes this to resolved GERD, on Protonix.        Suspected GERD  On Protonix     Counseled on diet and exercise- eventual goal of 30-60 minutes 5-7 times a week as per AHA guidelines.  Walking 2 miles daily, gardening. Continue current care and f/u in 1 year, sooner PRN. Yonatan Chester MD    This virtual visit was conducted with audiovisual connection using [employer] services. Pursuant to the emergency declaration under the [facility], [facility] waiver authority and the REES46 and [employer] Act, this Virtual Visit was conducted, with patient's consent, to reduce the patient's risk of exposure to COVID-19 and provide continuity of care for an established patient. Services were provided through a video synchronous discussion virtually to substitute for in-person clinic visit.

## 2020-04-22 ENCOUNTER — VIRTUAL VISIT (OUTPATIENT)
Dept: CARDIOLOGY CLINIC | Age: 75
End: 2020-04-22

## 2020-04-22 VITALS
WEIGHT: 156 LBS | BODY MASS INDEX: 29.45 KG/M2 | DIASTOLIC BLOOD PRESSURE: 86 MMHG | SYSTOLIC BLOOD PRESSURE: 139 MMHG | HEIGHT: 61 IN

## 2020-04-22 DIAGNOSIS — E78.2 MIXED HYPERLIPIDEMIA: ICD-10-CM

## 2020-04-22 DIAGNOSIS — I10 ESSENTIAL HYPERTENSION, BENIGN: ICD-10-CM

## 2020-04-22 DIAGNOSIS — G47.33 OSA (OBSTRUCTIVE SLEEP APNEA): ICD-10-CM

## 2020-04-22 DIAGNOSIS — I48.0 PAROXYSMAL ATRIAL FIBRILLATION (HCC): Primary | ICD-10-CM

## 2020-04-22 NOTE — PROGRESS NOTES
Chief Complaint   Patient presents with    Follow-up    Irregular Heart Beat    Cholesterol Problem    patient feels anxious and heart is beating hard.

## 2020-07-28 ENCOUNTER — TELEPHONE (OUTPATIENT)
Dept: SLEEP MEDICINE | Age: 75
End: 2020-07-28

## 2020-08-13 ENCOUNTER — DOCUMENTATION ONLY (OUTPATIENT)
Dept: SLEEP MEDICINE | Age: 75
End: 2020-08-13

## 2020-11-24 ENCOUNTER — TELEPHONE (OUTPATIENT)
Dept: SLEEP MEDICINE | Age: 75
End: 2020-11-24

## 2020-11-24 ENCOUNTER — VIRTUAL VISIT (OUTPATIENT)
Dept: SLEEP MEDICINE | Age: 75
End: 2020-11-24
Payer: MEDICARE

## 2020-11-24 DIAGNOSIS — G47.33 OSA (OBSTRUCTIVE SLEEP APNEA): Primary | ICD-10-CM

## 2020-11-24 DIAGNOSIS — I10 ESSENTIAL HYPERTENSION: ICD-10-CM

## 2020-11-24 DIAGNOSIS — Z86.59 H/O ANXIETY DISORDER: ICD-10-CM

## 2020-11-24 PROCEDURE — G8427 DOCREV CUR MEDS BY ELIG CLIN: HCPCS | Performed by: INTERNAL MEDICINE

## 2020-11-24 PROCEDURE — G8419 CALC BMI OUT NRM PARAM NOF/U: HCPCS | Performed by: INTERNAL MEDICINE

## 2020-11-24 PROCEDURE — G8400 PT W/DXA NO RESULTS DOC: HCPCS | Performed by: INTERNAL MEDICINE

## 2020-11-24 PROCEDURE — G8756 NO BP MEASURE DOC: HCPCS | Performed by: INTERNAL MEDICINE

## 2020-11-24 PROCEDURE — G8432 DEP SCR NOT DOC, RNG: HCPCS | Performed by: INTERNAL MEDICINE

## 2020-11-24 PROCEDURE — G8536 NO DOC ELDER MAL SCRN: HCPCS | Performed by: INTERNAL MEDICINE

## 2020-11-24 PROCEDURE — 1101F PT FALLS ASSESS-DOCD LE1/YR: CPT | Performed by: INTERNAL MEDICINE

## 2020-11-24 PROCEDURE — 1090F PRES/ABSN URINE INCON ASSESS: CPT | Performed by: INTERNAL MEDICINE

## 2020-11-24 PROCEDURE — 99213 OFFICE O/P EST LOW 20 MIN: CPT | Performed by: INTERNAL MEDICINE

## 2020-11-24 NOTE — PATIENT INSTRUCTIONS
217 [facility] [place]. 1668 [facility], [address] Tel.  [phone] Fax. 100 [facility] 60 [place], 200 [address] [facility] Tel.  [phone] Fax. [phone] [address] Tel.  [phone] Fax. [phone] Learning About CPAP for Sleep Apnea What is CPAP? CPAP is a small machine that you use at home every night while you sleep. It increases air pressure in your throat to keep your airway open. When you have sleep apnea, this can help you sleep better so you feel much better. CPAP stands for \"continuous positive airway pressure. \" The CPAP machine will have one of the following: · A mask that covers your nose and mouth · Prongs that fit into your nose · A mask that covers your nose only, the most common type. This type is called NCPAP. The N stands for \"nasal.\" Why is it done? CPAP is usually the best treatment for obstructive sleep apnea. It is the first treatment choice and the most widely used. Your doctor may suggest CPAP if you have: · Moderate to severe sleep apnea. · Sleep apnea and coronary artery disease (CAD) or heart failure. How does it help? · CPAP can help you have more normal sleep, so you feel less sleepy and more alert during the daytime. · CPAP may help keep heart failure or other heart problems from getting worse. · NCPAP may help lower your blood pressure. · If you use CPAP, your bed partner may also sleep better because you are not snoring or restless. What are the side effects? Some people who use CPAP have: · A dry or stuffy nose and a sore throat. · Irritated skin on the face. · Sore eyes. · Bloating. If you have any of these problems, work with your doctor to fix them. Here are some things you can try: · Be sure the mask or nasal prongs fit well. · See if your doctor can adjust the pressure of your CPAP. · If your nose is dry, try a humidifier. · If your nose is runny or stuffy, try decongestant medicine or a steroid nasal spray. If these things do not help, you might try a different type of machine. Some machines have air pressure that adjusts on its own. Others have air pressures that are different when you breathe in than when you breathe out. This may reduce discomfort caused by too much pressure in your nose. Where can you learn more? Go to eTask.it.be Enter O338 in the search box to learn more about \"Learning About CPAP for Sleep Apnea. \"  
© 0127-6655 Healthwise, Incorporated. Care instructions adapted under license by [employer] (which disclaims liability or warranty for this information). This care instruction is for use with your licensed healthcare professional. If you have questions about a medical condition or this instruction, always ask your healthcare professional. [address] any warranty or liability for your use of this information. Content Version: 2.5.88278; Last Revised: January 11, 2010 PROPER SLEEP HYGIENE What to avoid · Do not have drinks with caffeine, such as coffee or black tea, for 8 hours before bed. · Do not smoke or use other types of tobacco near bedtime. Nicotine is a stimulant and can keep you awake. · Avoid drinking alcohol late in the evening, because it can cause you to wake in the middle of the night. · Do not eat a big meal close to bedtime. If you are hungry, eat a light snack. · Do not drink a lot of water close to bedtime, because the need to urinate may wake you up during the night. · Do not read or watch TV in bed. Use the bed only for sleeping and sexual activity. What to try · Go to bed at the same time every night, and wake up at the same time every morning. Do not take naps during the day. · Keep your bedroom quiet, dark, and cool. · Get regular exercise, but not within 3 to 4 hours of your bedtime. Kar Correias · Sleep on a comfortable pillow and mattress. · If watching the clock makes you anxious, turn it facing away from you so you cannot see the time. · If you worry when you lie down, start a worry book. Well before bedtime, write down your worries, and then set the book and your concerns aside. · Try meditation or other relaxation techniques before you go to bed. · If you cannot fall asleep, get up and go to another room until you feel sleepy. Do something relaxing. Repeat your bedtime routine before you go to bed again. · Make your house quiet and calm about an hour before bedtime. Turn down the lights, turn off the TV, log off the computer, and turn down the volume on music. This can help you relax after a busy day. Drowsy Driving: The [facility] cites drowsiness as a causing factor in more than 492,539 police reported crashes annually, resulting in 76,000 injuries and 1,500 deaths. Other surveys suggest 55% of people polled have driven while drowsy in the past year, 23% had fallen asleep but not crashed, 3% crashed, and 2% had and accident due to drowsy driving. Who is at risk? Young Drivers: One study of drowsy driving accidents states that 55% of the drivers were under 25 years. Of those, 75% were male. Shift Workers and Travelers: People who work overnight or travel across time zones frequently are at higher risk of experiencing Circadian Rhythm Disorders. They are trying to work and function when their body is programed to sleep. Sleep Deprived: Lack of sleep has a serious impact on your ability to pay attention or focus on a task. Consistently getting less than the average of 8 hours your body needs creates partial or cumulative sleep deprivation.   
Untreated Sleep Disorders: Sleep Apnea, Narcolepsy, R.L.S., and other sleep disorders (untreated) prevent a person from getting enough restful sleep. This leads to excessive daytime sleepiness and increases the risk for drowsy driving accidents by up to 7 times. Medications / Alcohol: Even over the counter medications can cause drowsiness. Medications that impair a drivers attention should have a warning label. Alcohol naturally makes you sleepy and on its own can cause accidents. Combined with excessive drowsiness its effects are amplified. Signs of Drowsy Driving: * You don't remember driving the last few miles * You may drift out of your arnold * You are unable to focus and your thoughts wander * You may yawn more often than normal 
 * You have difficulty keeping your eyes open / nodding off * Missing traffic signs, speeding, or tailgating Prevention-  
Good sleep hygiene, lifestyle and behavioral choices have the most impact on drowsy driving. There is no substitute for sleep and the average person requires 8 hours nightly. If you find yourself driving drowsy, stop and sleep. Consider the sleep hygiene tips provided during your visit as well. Medication Refill Policy: Refills for all medications require 1 week advance notice. Please have your pharmacy fax a refill request. We are unable to fax, or call in \"controled substance\" medications and you will need to pick these prescriptions up from our office. LOAG Activation Thank you for requesting access to LOAG. Please follow the instructions below to securely access and download your online medical record. LOAG allows you to send messages to your doctor, view your test results, renew your prescriptions, schedule appointments, and more. How Do I Sign Up? 1. In your internet browser, go to [email or web address]. 2. Click on the First Time User? Click Here link in the Sign In box. You will see the New Member Sign Up page. 3. Enter your LOAG Access Code exactly as it appears below.  You will not need to use this code after youve completed the sign-up process. If you do not sign up before the expiration date, you must request a new code. iKONVERSE Access Code: [email or web address] Expires: 2021  2:32 PM (This is the date your iKONVERSE access code will ) 4. Enter the last four digits of your Social Security Number (xxxx) and Date of Birth (mm/dd/yyyy) as indicated and click Submit. You will be taken to the next sign-up page. 5. Create a iKONVERSE ID. This will be your iKONVERSE login ID and cannot be changed, so think of one that is secure and easy to remember. 6. Create a iKONVERSE password. You can change your password at any time. 7. Enter your Password Reset Question and Answer. This can be used at a later time if you forget your password. 8. Enter your e-mail address. You will receive e-mail notification when new information is available in [address]. 9. Click Sign Up. You can now view and download portions of your medical record. 10. Click the Download Summary menu link to download a portable copy of your medical information. Additional Information If you have questions, please call [phone]. Remember, iKONVERSE is NOT to be used for urgent needs. For medical emergencies, dial 911.

## 2020-11-24 NOTE — PROGRESS NOTES
[address] [place]. [place], [address]  Tel.  [phone]  Fax. 100 [facility] 60  [address] [facility] [address], [address]  Tel.  [phone]  Fax. [phone] [address] [facility] Cornelius Bird  Tel.  [phone]  Fax. [phone]       S>    Aleksandra Bazan is a 66 y.o. female who was seen by synchronous (real-time) audio-video technology on 11/24/2020. Consent:  She and/or her healthcare decision maker is aware that this patient-initiated Telehealth encounter is a billable service, with coverage as determined by her insurance carrier. She is aware that she may receive a bill and has provided verbal consent to proceed: Yes    I was in the office while conducting this encounter. Patient verified with 's License. She reports no problems using the device. She is 90% compliant over the past 30 days. The following problems are identified:    Drowsiness no Problems exhaling no   Snoring no Forget to put on no   Mask Comfortable yes Can't fall asleep no   Dry Mouth no Mask falls off no   Air Leaking no Frequent awakenings no         She admits that her sleep has improved on PAP therapy using nasal mask and non-heated tubing. Allergies   Allergen Reactions    Latex Itching    Adhesive Tape-Silicones Other (comments)     Causes skin to turn red    Morphine Other (comments)     States makes her breathing too slow.     Codeine Other (comments)     abd pain      Crestor [Rosuvastatin] Itching    Dicyclomine Other (comments)     Chest pain     Iodine And Iodide Containing Products Rash     Caused itching and a rash    Lipitor [Atorvastatin] Myalgia     Joint pain      Olux [Clobetasol] Rash    Other Medication Rash     Itch Relief Mositurizing Lotion    Pcn [Penicillins] Hives       She has a current medication list which includes the following prescription(s): docosahexaenoic acid/epa, linaclotide, amlodipine, ezetimibe, hydrochlorothiazide, metoprolol succinate, pitavastatin calcium, pantoprazole, cpap machine, aspirin delayed-release, naproxen, celecoxib, biotin, flaxseed oil, and estradiol. .      She  has a past medical history of Anxiety, Arrhythmia, Arthritis, Diverticulosis, Fibrocystic breast changes (7/11/2011), GERD (gastroesophageal reflux disease), Hiatal hernia, High cholesterol, Hypertension, Migraine, MARIUSZ (obstructive sleep apnea) (04-), Other ill-defined conditions(799.89), and Unspecified sleep apnea. [place] Sleepiness Score: 6   and Modified F.O.S.Q. Score Total / 2: 18.5   which reflect improved sleep quality over therapy time. O>        Patient-Reported Vitals 11/24/2020   Patient-Reported Weight 150lb       Physical Exam completed by visual and auditory observation of patient with verbal input from patient. General:   Alert, oriented, not in acute distress   Eyes:  Anicteric Sclerae; no obvious strabismus   Nose:  No obvious nasal septum deviation    Neck:   Midline trachea, no visible mass   Chest/Lungs:  Respiratory effort normal, no visualized signs of difficulty breathing or respiratory distress   CVS:  No JVD   Extremities:  No obvious rashes noted on face, neck, or hands   Neuro:  No facial asymmetry, no focal deficits; no obvious tremor    Psych:  Normal affect,  normal countenance         A>    ICD-10-CM ICD-9-CM    1. MARIUSZ (obstructive sleep apnea)  G47.33 327.23 AMB SUPPLY ORDER      AMB SUPPLY ORDER   2. Essential hypertension  I10 401.9    3. H/O anxiety disorder  Z86.59 V11.8    4. BMI 28.0-28.9,adult  Z68.28 V85.24      AHI = 15 (2010). On Resmed :  CPAP 6 cmH2O. Compliant:      yes    Therapeutic Response:  Positive    P>    * Patient is using her PAP device regularly and benefiting form therapy,  continued use of the device at 6 cmH2O is advised.     Orders Placed This Encounter    AMB SUPPLY ORDER     Diagnosis: (G47.33) MARIUSZ (obstructive sleep apnea)  (primary encounter diagnosis)     Replacement Supplies for Positive Airway Pressure Therapy Device:   Duration of need: 99 months.  Nasal Pillows Combo Mask (Replace) 2 per month.  Nasal Pillows (Replace) 2 per month.  Headgear 1 every 6 months.  Tubing 1 every 3 months.  Filter(s) Disposable 2 per month.  Filter(s) Non-Disposable 1 every 6 months. .   [address] for Humidifier (Replace) 1 every 6 months. Calvin Montalvo MD, FAASM; NPI: [id number]    Electronically signed. Date:- 11/24/20    AMB SUPPLY ORDER     Diagnosis: Sleep Apnea ICD-10 Code (G47.30); ICD-9 Code (780.57). CPAP mask - Perform mask fit and provide patient with appropriate mask / headgear. Patient indicates that current headgear presses on her cheek. Calvin Montalvo MD, FAASM; NPI: [id number]  Electronically signed. 11/24/20       * She is aware of the relationship between MARIUSZ and HTN which is stable as is her mood (patient is currently on anti-depressant therapy). * We have recommended a dedicated weight loss through appropriate diet and an exercise regiment as significant weight reduction has been shown to reduce severity of obstructive sleep apnea. *   Follow-up and Dispositions    · Return in about 1 year (around 11/24/2021), or if symptoms worsen or fail to improve. * She was asked to contact our office for any problems regarding PAP therapy. * Counseling was provided regarding the importance of regular PAP use and on proper sleep hygiene and safe driving. * Re-enforced proper and regular cleaning for the device. Thank you for allowing us to participate in your patient's medical care.     Pursuant to the emergency declaration under the [facility], [address] waiver authority and the [employer] Act, this Virtual  Visit was conducted, with patient's consent, to reduce the patient's risk of exposure to COVID-19 and provide continuity of care for an established patient. Services were provided through a video synchronous discussion virtually to substitute for in-person clinic visit. Aakash Pepper MD, FAASM  Electronically signed.  11/24/20

## 2020-11-24 NOTE — TELEPHONE ENCOUNTER
Called patient to schedule yearly f/u  Scheduled, but patient will call back to let us know about the name of the DME company

## 2020-11-27 ENCOUNTER — TELEPHONE (OUTPATIENT)
Dept: SLEEP MEDICINE | Age: 75
End: 2020-11-27

## 2020-11-27 ENCOUNTER — DOCUMENTATION ONLY (OUTPATIENT)
Dept: SLEEP MEDICINE | Age: 75
End: 2020-11-27

## 2021-07-12 ENCOUNTER — TELEPHONE (OUTPATIENT)
Dept: SLEEP MEDICINE | Age: 76
End: 2021-07-12

## 2021-07-26 ENCOUNTER — TELEPHONE (OUTPATIENT)
Dept: SLEEP MEDICINE | Age: 76
End: 2021-07-26

## 2021-07-26 DIAGNOSIS — G47.33 OSA (OBSTRUCTIVE SLEEP APNEA): Primary | ICD-10-CM

## 2021-07-26 NOTE — TELEPHONE ENCOUNTER
Patient phoned the office and is requesting an order for supplies. She will call our office with her DME of choice.

## 2021-08-06 NOTE — TELEPHONE ENCOUNTER
Orders Placed This Encounter    AMB SUPPLY ORDER     Diagnosis: (G47.33) MARIUSZ (obstructive sleep apnea)  (primary encounter diagnosis)     Replacement Supplies for Positive Airway Pressure Therapy Device:   Duration of need: 99 months.  Nasal Pillows Combo Mask (Replace) 2 per month.  Nasal Pillows (Replace) 2 per month.  Headgear 1 every 6 months.  Tubing 1 every 3 months.  Filter(s) Disposable 2 per month.  Filter(s) Non-Disposable 1 every 6 months. .   [address] [facility] for Humidifier (Replace) 1 every 6 months. Shima Tolbert MD, FAASM; NPI: [id number]    Electronically signed.  Date:- 08/06/21

## 2021-08-11 ENCOUNTER — TELEPHONE (OUTPATIENT)
Dept: SLEEP MEDICINE | Age: 76
End: 2021-08-11

## 2021-08-13 ENCOUNTER — DOCUMENTATION ONLY (OUTPATIENT)
Dept: SLEEP MEDICINE | Age: 76
End: 2021-08-13

## 2021-08-19 ENCOUNTER — DOCUMENTATION ONLY (OUTPATIENT)
Dept: SLEEP MEDICINE | Age: 76
End: 2021-08-19

## 2021-08-19 NOTE — PROGRESS NOTES
Patient requested that her PAP supply orders be forwarded to [facility] at [address] [facility], [address], [place], [address] [facility] Cristina S, T [phone]. She would like our office to cancel all order sent to [address] [place] [address]. Above request completed today.

## 2021-09-20 ENCOUNTER — OFFICE VISIT (OUTPATIENT)
Dept: CARDIOLOGY CLINIC | Age: 76
End: 2021-09-20
Payer: MEDICARE

## 2021-09-20 VITALS
HEART RATE: 78 BPM | SYSTOLIC BLOOD PRESSURE: 140 MMHG | BODY MASS INDEX: 27.62 KG/M2 | OXYGEN SATURATION: 97 % | WEIGHT: 146.3 LBS | DIASTOLIC BLOOD PRESSURE: 60 MMHG | HEIGHT: 61 IN | RESPIRATION RATE: 18 BRPM

## 2021-09-20 DIAGNOSIS — G47.33 OSA (OBSTRUCTIVE SLEEP APNEA): ICD-10-CM

## 2021-09-20 DIAGNOSIS — I10 ESSENTIAL HYPERTENSION, BENIGN: Primary | ICD-10-CM

## 2021-09-20 DIAGNOSIS — E78.2 MIXED HYPERLIPIDEMIA: ICD-10-CM

## 2021-09-20 DIAGNOSIS — I48.0 PAF (PAROXYSMAL ATRIAL FIBRILLATION) (HCC): ICD-10-CM

## 2021-09-20 PROCEDURE — 93005 ELECTROCARDIOGRAM TRACING: CPT | Performed by: INTERNAL MEDICINE

## 2021-09-20 PROCEDURE — G8536 NO DOC ELDER MAL SCRN: HCPCS | Performed by: INTERNAL MEDICINE

## 2021-09-20 PROCEDURE — G8400 PT W/DXA NO RESULTS DOC: HCPCS | Performed by: INTERNAL MEDICINE

## 2021-09-20 PROCEDURE — G8753 SYS BP > OR = 140: HCPCS | Performed by: INTERNAL MEDICINE

## 2021-09-20 PROCEDURE — 93010 ELECTROCARDIOGRAM REPORT: CPT | Performed by: INTERNAL MEDICINE

## 2021-09-20 PROCEDURE — 99214 OFFICE O/P EST MOD 30 MIN: CPT | Performed by: INTERNAL MEDICINE

## 2021-09-20 PROCEDURE — 1101F PT FALLS ASSESS-DOCD LE1/YR: CPT | Performed by: INTERNAL MEDICINE

## 2021-09-20 PROCEDURE — G0463 HOSPITAL OUTPT CLINIC VISIT: HCPCS | Performed by: INTERNAL MEDICINE

## 2021-09-20 PROCEDURE — G8754 DIAS BP LESS 90: HCPCS | Performed by: INTERNAL MEDICINE

## 2021-09-20 PROCEDURE — G8427 DOCREV CUR MEDS BY ELIG CLIN: HCPCS | Performed by: INTERNAL MEDICINE

## 2021-09-20 PROCEDURE — 1090F PRES/ABSN URINE INCON ASSESS: CPT | Performed by: INTERNAL MEDICINE

## 2021-09-20 PROCEDURE — G8419 CALC BMI OUT NRM PARAM NOF/U: HCPCS | Performed by: INTERNAL MEDICINE

## 2021-09-20 PROCEDURE — G8510 SCR DEP NEG, NO PLAN REQD: HCPCS | Performed by: INTERNAL MEDICINE

## 2021-09-20 NOTE — LETTER
9/20/2021    Patient: Napoleon Landis   YOB: 1945   Date of Visit: 9/20/2021     Adriane Telles MD  [address] [place] [address]  [address] [phone]  Via Fax: [phone]    Dear Adriane Telles MD,      Thank you for referring Ms. Tim Breen to [facility] for evaluation. My notes for this consultation are attached. If you have questions, please do not hesitate to call me. I look forward to following your patient along with you.       Sincerely,    Aracely Arteaga MD

## 2021-09-20 NOTE — PROGRESS NOTES
1. Have you been to the ER, urgent care clinic since your last visit? Hospitalized since your last visit? No    2. Have you seen or consulted any other health care providers outside of the [address] [place] [address] since your last visit? Include any pap smears or colon screening. No     Chief Complaint   Patient presents with    Follow-up     1 yr f/up. palpitations noticed after drinking anything with caffeine.

## 2021-09-20 NOTE — PROGRESS NOTES
[address] [facility], [address] [facility]  [phone]     Subjective:      Paramjit Urrutia is a 64 y.o. female is here for routine f/u. Pmhx PAF, HTN, HLD, GERD and MARIUSZ. Last seen by us via VV in 2020. She's doing well over all. Only palpitation when she drinks caffeinated drinks, doesn't last and resolve almost instantaneously. She walks for exercise (10-16 K steps daily) and does a lot of yard work / gardening, no exertional symptoms. The patient denies chest pain/ shortness of breath, orthopnea, PND, LE edema,  syncope, or presyncope.        Patient Active Problem List    Diagnosis Date Noted    Irregular heartbeat 2018    PAF (paroxysmal atrial fibrillation) ([facility] 2017    Anemia 2016    Hematuria 2015    RLQ abdominal pain 08/15/2012    Obstructive sleep apnea (adult) (pediatric) 2012    History of emotional problems 2012    Mixed hyperlipidemia 2012    Essential hypertension, benign 2012    Atrial fibrillation ([facility] 2012    Fibrocystic breast changes 2011    Arthritis     Palpitations 2011    Tick bite 2011      Zandra Edwards MD  Past Medical History:   Diagnosis Date    Anxiety     Arrhythmia     palpitations    Arthritis     Atrial fibrillation ([facility]     Diverticulosis     Fibrocystic breast changes 2011    GERD (gastroesophageal reflux disease)     Hiatal hernia     High cholesterol     Hypertension     Long term current use of anticoagulant therapy     Migraine     MARIUSZ (obstructive sleep apnea) 2009    AHI: 15 per hour    Other ill-defined conditions(799.89)     VULVA-BURNING    Unspecified sleep apnea     uses c-pap      Past Surgical History:   Procedure Laterality Date    HX APPENDECTOMY      HX CHOLECYSTECTOMY      HX GYN       x 3, hysterectomy    HX ORTHOPAEDIC      L knee scope    HX ORTHOPAEDIC      left foot bunionectomy    HX RECTOCELE REPAIR      HX UROLOGICAL      BLADDER TACKING    NH BREAST SURGERY PROCEDURE UNLISTED  1985    left breast lumpectomy     Allergies   Allergen Reactions    Latex Itching    Adhesive Tape-Silicones Other (comments)     Causes skin to turn red    Morphine Other (comments)     States makes her breathing too slow.  Codeine Other (comments)     abd pain      Crestor [Rosuvastatin] Itching    Dicyclomine Other (comments)     Chest pain     Iodine And Iodide Containing Products Rash     Caused itching and a rash    Lipitor [Atorvastatin] Myalgia     Joint pain      Olux [Clobetasol] Rash    Other Medication Rash     Itch Relief Mositurizing Lotion    Pcn [Penicillins] Hives      History reviewed. No pertinent family history. Social History     Socioeconomic History    Marital status:      Spouse name: Not on file    Number of children: Not on file    Years of education: Not on file    Highest education level: Not on file   Occupational History    Not on file   Tobacco Use    Smoking status: Never Smoker    Smokeless tobacco: Never Used   Substance and Sexual Activity    Alcohol use: No    Drug use: No    Sexual activity: Yes     Partners: Male   Other Topics Concern    Not on file   Social History Narrative    Not on file     Social Determinants of Health     Financial Resource Strain:     Difficulty of Paying Living Expenses:    Food Insecurity:     Worried About Running Out of Food in the Last Year:     [address] in the Last Year:    Transportation Needs:     Lack of Transportation (Medical):      Lack of Transportation (Non-Medical):    Physical Activity:     Days of Exercise per Week:     Minutes of Exercise per Session:    Stress:     Feeling of Stress :    Social Connections:     Frequency of Communication with Friends and Family:     Frequency of Social Gatherings with Friends and Family:     Attends Holiness Services:     Active Member of Clubs or Organizations:     Attends Club or Organization Meetings:     Marital Status:    Intimate Partner Violence:     Fear of Current or Ex-Partner:     Emotionally Abused:     Physically Abused:     Sexually Abused:       Current Outpatient Medications   Medication Sig    docosahexaenoic acid/epa (FISH OIL PO) Take  by mouth.  linaCLOtide (Linzess) 145 mcg cap capsule Take 145 mcg by mouth Daily (before breakfast).  amLODIPine (NORVASC) 5 mg tablet TAKE ONE TABLET BY MOUTH ONCE DAILY    ezetimibe (ZETIA) 10 mg tablet Take 1 Tab by mouth daily. TAKE ONE TABLET BY MOUTH ONCE DAILY    hydroCHLOROthiazide (HYDRODIURIL) 25 mg tablet TAKE ONE TABLET BY MOUTH ONCE DAILY    metoprolol succinate (TOPROL-XL) 50 mg XL tablet TAKE ONE TABLET BY MOUTH ONCE DAILY    pitavastatin calcium (LIVALO) 4 mg tab tablet TAKE ONE TABLET BY MOUTH ONCE DAILY    cpap machine kit by Does Not Apply route.  BIOTIN PO Take 5,000 mcg by mouth daily.  aspirin delayed-release (ASPIR-LOW) 81 mg tablet Take 81 mg by mouth daily.  naproxen (NAPROSYN) 500 mg tablet Take 500 mg by mouth two (2) times daily as needed.  celecoxib (CELEBREX) 200 mg capsule Take 200 mg by mouth as needed. (Patient not taking: Reported on 9/20/2021)    pantoprazole (PROTONIX) 40 mg tablet  (Patient not taking: Reported on 9/20/2021)    FLAXSEED OIL PO Take 1,000 mg by mouth daily. (Patient not taking: Reported on 9/20/2021)    estradiol (ESTRACE) 1 mg tablet Take 1 mg by mouth as needed. Three times weekly (Patient not taking: Reported on 9/20/2021)     No current facility-administered medications for this visit. Review of Symptoms:  11 systems reviewed, negative other than as stated in the HPI    Physical ExamPhysical Exam:    Vitals:    09/20/21 1436   BP: (!) 140/60   Pulse: 78   Resp: 18   SpO2: 97%   Weight: 146 lb 4.8 oz (66.4 kg)   Height: 5' 1\" (1.549 m)     Body mass index is 27.64 kg/m².   General PE  Gen:  NAD  Mental Status - Alert. General Appearance - Not in acute distress. HEENT:  PERRL, no carotid bruits or JVD  Chest and Lung Exam   Inspection: Accessory muscles - No use of accessory muscles in breathing. Auscultation:   Breath sounds: - Normal.   Cardiovascular   Inspection: Jugular vein - Bilateral - Inspection Normal.   Palpation/Percussion:   Apical Impulse: - Normal.   Auscultation: Rhythm - Regular. Heart Sounds - S1 WNL and S2 WNL. No S3 or S4. Murmurs & Other Heart Sounds: Auscultation of the heart reveals - No Murmurs. Peripheral Vascular   Upper Extremity: Inspection - Bilateral - No Cyanotic nailbeds or Digital clubbing. Lower Extremity:   Palpation: Edema - Bilateral - No edema. Abdomen:   Soft, non-tender, bowel sounds are active.   Neuro: A&O times 3, CN and motor grossly WNL    Labs:   Lab Results   Component Value Date/Time    Cholesterol, total 164 10/11/2018 08:46 AM    Cholesterol, total 176 12/11/2017 11:43 AM    Cholesterol, total 210 (H) 12/06/2016 01:05 PM    Cholesterol, total 167 05/16/2016 12:07 PM    Cholesterol, total 165 06/25/2015 12:27 PM    HDL Cholesterol 57 10/11/2018 08:46 AM    HDL Cholesterol 49 12/11/2017 11:43 AM    HDL Cholesterol 62 12/06/2016 01:05 PM    HDL Cholesterol 54 05/16/2016 12:07 PM    HDL Cholesterol 54 06/25/2015 12:27 PM    LDL, calculated 86 10/11/2018 08:46 AM    LDL, calculated 96 12/11/2017 11:43 AM    LDL, calculated 114 (H) 12/06/2016 01:05 PM    LDL, calculated 72 05/16/2016 12:07 PM    LDL, calculated 70 06/25/2015 12:27 PM    Triglyceride 103 10/11/2018 08:46 AM    Triglyceride 154 (H) 12/11/2017 11:43 AM    Triglyceride 171 (H) 12/06/2016 01:05 PM    Triglyceride 206 (H) 05/16/2016 12:07 PM    Triglyceride 207 (H) 06/25/2015 12:27 PM     Lab Results   Component Value Date/Time     (H) 05/21/2011 01:00 PM     Lab Results   Component Value Date/Time    Sodium 143 10/11/2018 08:46 AM    Potassium 4.4 10/11/2018 08:46 AM    Chloride 101 10/11/2018 08:46 AM CO2 27 10/11/2018 08:46 AM    Anion gap 6 05/12/2018 10:17 AM    Glucose 99 10/11/2018 08:46 AM    BUN 16 10/11/2018 08:46 AM    Creatinine 0.73 10/11/2018 08:46 AM    BUN/Creatinine ratio 22 10/11/2018 08:46 AM    GFR est AA 93 10/11/2018 08:46 AM    GFR est non-AA 81 10/11/2018 08:46 AM    Calcium 9.8 10/11/2018 08:46 AM    Bilirubin, total 0.3 10/11/2018 08:46 AM    Alk. phosphatase 68 10/11/2018 08:46 AM    Protein, total 7.1 10/11/2018 08:46 AM    Albumin 4.4 10/11/2018 08:46 AM    Globulin 4.3 (H) 05/12/2018 10:17 AM    A-G Ratio 1.6 10/11/2018 08:46 AM    ALT (SGPT) 22 10/11/2018 08:46 AM       EKG:  NSR      Assessment:     Assessment:        ICD-10-CM ICD-9-CM    1. Essential hypertension, benign  I10 401.1 AMB POC EKG ROUTINE W/ 12 LEADS, INTER & REP   2. PAF (paroxysmal atrial fibrillation) (HCC)  I48.0 427.31    3. Mixed hyperlipidemia  E78.2 272.2    4. MARIUSZ (obstructive sleep apnea)  G47.33 327.23        Orders Placed This Encounter    AMB POC EKG ROUTINE W/ 12 LEADS, INTER & REP     Order Specific Question:   Reason for Exam:     Answer:   routine        Plan:     PAF  Normal EF mild MR per echo in 2014  Distant history of PAF, without recurrence in a long time  previous Holter monitor in 2015 and then event monitor May 2018 shows no recurrence of A fib   Continue ASA, Toprol Xl 50 mg daily  If she has more than occasional and fleeting palpitations lasting a few seconds, she will call for an event monitor  Low threshold to anticoagulate in the future if recurrent atrial fibrillation.        HTN  Controlled with current therapy  Stable kidney fxn in 10/19 Cr 0.73        HLD  10/19 LDL at 64  Continue zetia and statin therapy  Labs and lipids per PCP--will request      MARIUSZ  Compliant with PAP therapy, followed by Dr Aditi Kent  For repeat sleep study when Covid pandemic resolves prior to prescription of oral appliance therapy [place] [facility]).     Continues to defer sleep study as of today     Precordial pain, resolved  Stress echo negative 5/2019.          Suspected GERD  Prev on Protonix     Counseled on diet and exercise- eventual goal of 30-60 minutes 5-7 times a week as per AHA guidelines.  Walking 10-16,000 steps daily, gardening. Continue current care and f/u in 1 yr        Romana Benitez NP    Patient seen and examined by me with the above nurse practitioner. I personally performed all components of the history, physical, and medical decision making and agree with the assessment and plan with minor modifications as noted. Today the patient presents with stable, HTN, hyperlipidemia, and remains in sinus rhythm. General PE  Gen:  NAD  Mental Status - Alert. General Appearance - Not in acute distress. HEENT:  PERRL, no carotid bruits or JVD  Chest and Lung Exam   Inspection: Accessory muscles - No use of accessory muscles in breathing. Auscultation:   Breath sounds: - Normal.   Cardiovascular   Inspection: Jugular vein - Bilateral - Inspection Normal.   Palpation/Percussion:   Apical Impulse: - Normal.   Auscultation: Rhythm - Regular. Heart Sounds - S1 WNL and S2 WNL. No S3 or S4. Murmurs & Other Heart Sounds: Auscultation of the heart reveals - No Murmurs. Peripheral Vascular   Upper Extremity: Inspection - Bilateral - No Cyanotic nailbeds or Digital clubbing. Lower Extremity:   Palpation: Edema - Bilateral - No edema. Abdomen:   Soft, non-tender, bowel sounds are active. Neuro: A&O times 3, CN and motor grossly WNL    Continue current cardiac care. Encouraged patient to strongly consider Covid vaccination. Follow up in 1 year, sooner as needed.

## 2021-10-10 ENCOUNTER — TRANSCRIBE ORDER (OUTPATIENT)
Dept: SCHEDULING | Age: 76
End: 2021-10-10

## 2021-10-10 DIAGNOSIS — M25.511 CHRONIC RIGHT SHOULDER PAIN: Primary | ICD-10-CM

## 2021-10-10 DIAGNOSIS — G89.29 CHRONIC RIGHT SHOULDER PAIN: Primary | ICD-10-CM

## 2021-10-13 ENCOUNTER — DOCUMENTATION ONLY (OUTPATIENT)
Dept: SLEEP MEDICINE | Age: 76
End: 2021-10-13

## 2021-10-20 ENCOUNTER — HOSPITAL ENCOUNTER (OUTPATIENT)
Dept: MRI IMAGING | Age: 76
Discharge: HOME | End: 2021-10-20
Attending: ORTHOPAEDIC SURGERY
Payer: MEDICARE

## 2021-10-20 DIAGNOSIS — G89.29 CHRONIC RIGHT SHOULDER PAIN: ICD-10-CM

## 2021-10-20 DIAGNOSIS — M25.511 CHRONIC RIGHT SHOULDER PAIN: ICD-10-CM

## 2021-10-20 PROCEDURE — 73221 MRI JOINT UPR EXTREM W/O DYE: CPT

## 2021-11-16 ENCOUNTER — TELEPHONE (OUTPATIENT)
Dept: SLEEP MEDICINE | Age: 76
End: 2021-11-16

## 2021-11-30 ENCOUNTER — OFFICE VISIT (OUTPATIENT)
Dept: SLEEP MEDICINE | Age: 76
End: 2021-11-30
Payer: MEDICARE

## 2021-11-30 ENCOUNTER — DOCUMENTATION ONLY (OUTPATIENT)
Dept: SLEEP MEDICINE | Age: 76
End: 2021-11-30

## 2021-11-30 VITALS
RESPIRATION RATE: 14 BRPM | SYSTOLIC BLOOD PRESSURE: 107 MMHG | HEART RATE: 81 BPM | TEMPERATURE: 98.2 F | BODY MASS INDEX: 28.51 KG/M2 | OXYGEN SATURATION: 98 % | HEIGHT: 61 IN | WEIGHT: 151 LBS | DIASTOLIC BLOOD PRESSURE: 60 MMHG

## 2021-11-30 DIAGNOSIS — Z86.59 H/O ANXIETY DISORDER: ICD-10-CM

## 2021-11-30 DIAGNOSIS — Z11.52 ENCOUNTER FOR SCREENING FOR COVID-19: ICD-10-CM

## 2021-11-30 DIAGNOSIS — G47.33 OSA (OBSTRUCTIVE SLEEP APNEA): Primary | ICD-10-CM

## 2021-11-30 DIAGNOSIS — I10 ESSENTIAL HYPERTENSION: ICD-10-CM

## 2021-11-30 PROCEDURE — 1090F PRES/ABSN URINE INCON ASSESS: CPT | Performed by: INTERNAL MEDICINE

## 2021-11-30 PROCEDURE — G8400 PT W/DXA NO RESULTS DOC: HCPCS | Performed by: INTERNAL MEDICINE

## 2021-11-30 PROCEDURE — G8419 CALC BMI OUT NRM PARAM NOF/U: HCPCS | Performed by: INTERNAL MEDICINE

## 2021-11-30 PROCEDURE — G8427 DOCREV CUR MEDS BY ELIG CLIN: HCPCS | Performed by: INTERNAL MEDICINE

## 2021-11-30 PROCEDURE — G8754 DIAS BP LESS 90: HCPCS | Performed by: INTERNAL MEDICINE

## 2021-11-30 PROCEDURE — 99214 OFFICE O/P EST MOD 30 MIN: CPT | Performed by: INTERNAL MEDICINE

## 2021-11-30 PROCEDURE — G8536 NO DOC ELDER MAL SCRN: HCPCS | Performed by: INTERNAL MEDICINE

## 2021-11-30 PROCEDURE — 1101F PT FALLS ASSESS-DOCD LE1/YR: CPT | Performed by: INTERNAL MEDICINE

## 2021-11-30 PROCEDURE — G8752 SYS BP LESS 140: HCPCS | Performed by: INTERNAL MEDICINE

## 2021-11-30 PROCEDURE — G8510 SCR DEP NEG, NO PLAN REQD: HCPCS | Performed by: INTERNAL MEDICINE

## 2021-11-30 RX ORDER — DICLOFENAC SODIUM 75 MG/1
TABLET, DELAYED RELEASE ORAL
COMMUNITY
Start: 2021-11-10

## 2021-11-30 NOTE — PROGRESS NOTES
Chief Complaint   Patient presents with    Sleep Apnea     annual f/u     Visit Vitals  /60 (BP 1 Location: Left upper arm)   Pulse 81   Temp 98.2 °F (36.8 °C) (Temporal)   Resp 14   Ht 5' 1\" (1.549 m)   Wt 151 lb (68.5 kg)   SpO2 98%   BMI 28.53 kg/m²

## 2021-11-30 NOTE — PATIENT INSTRUCTIONS
[address] [facility] [place]. [place], [address]  Tel.  [phone]  Fax. 100 [facility] 60  [place], [address] [facility]  Tel.  [phone]  Fax. [phone] [address] [facility] [address]  Tel.  [phone]  Fax. [phone]     Sleep Apnea: After Your Visit  Your Care Instructions  Sleep apnea occurs when you frequently stop breathing for 10 seconds or longer during sleep. It can be mild to severe, based on the number of times per hour that you stop breathing or have slowed breathing. Blocked or narrowed airways in your nose, mouth, or throat can cause sleep apnea. Your airway can become blocked when your throat muscles and tongue relax during sleep. Sleep apnea is common, occurring in 1 out of 20 individuals. Individuals having any of the following characteristics should be evaluated and treated right away due to high risk and detrimental consequences from untreated sleep apnea:  1. Obesity  2. Congestive Heart failure  3. Atrial Fibrillation  4. Uncontrolled Hypertension  5. Type II Diabetes  6. Night-time Arrhythmias  7. Stroke  8. Pulmonary Hypertension  9. High-risk Driving Populations (pilots, truck drivers, etc.)  10. Patients Considering Weight-loss Surgery    How do you know you have sleep apnea? You probably have sleep apnea if you answer 'yes' to 3 or more of the following questions:  S - Have you been told that you Snore? T - Are you often Tired during the day? O - Has anyone Observed you stop breathing while sleeping? P- Do you have (or are being treated for) high blood Pressure? B - Are you obese (Body Mass Index > 35)? A - Is your Age 48years old or older? N - Is your Neck size greater than 16 inches? G - Are you male Gender? A sleep physician can prescribe a breathing device that prevents tissues in the throat from blocking your airway.  Or your doctor may recommend using a dental device (oral breathing device) to help keep your airway open. In some cases, surgery may be needed to remove enlarged tissues in the throat. Follow-up care is a key part of your treatment and safety. Be sure to make and go to all appointments, and call your doctor if you are having problems. It's also a good idea to know your test results and keep a list of the medicines you take. How can you care for yourself at home? · Lose weight, if needed. It may reduce the number of times you stop breathing or have slowed breathing. · Go to bed at the same time every night. · Sleep on your side. It may stop mild apnea. If you tend to roll onto your back, sew a pocket in the back of your pajama top. Put a tennis ball into the pocket, and stitch the pocket shut. This will help keep you from sleeping on your back. · Avoid alcohol and medicines such as sleeping pills and sedatives before bed. · Do not smoke. Smoking can make sleep apnea worse. If you need help quitting, talk to your doctor about stop-smoking programs and medicines. These can increase your chances of quitting for good. · Prop up the head of your bed 4 to 6 inches by putting bricks under the legs of the bed. · Treat breathing problems, such as a stuffy nose, caused by a cold or allergies. · Use a continuous positive airway pressure (CPAP) breathing machine if lifestyle changes do not help your apnea and your doctor recommends it. The machine keeps your airway from closing when you sleep. · If CPAP does not help you, ask your doctor whether you should try other breathing machines. A bilevel positive airway pressure machine has two types of air pressureâone for breathing in and one for breathing out. Another device raises or lowers air pressure as needed while you breathe. · If your nose feels dry or bleeds when using one of these machines, talk with your doctor about increasing moisture in the air. A humidifier may help.   · If your nose is runny or stuffy from using a breathing machine, talk with your doctor about using decongestants or a corticosteroid nasal spray. When should you call for help? Watch closely for changes in your health, and be sure to contact your doctor if:  · You still have sleep apnea even though you have made lifestyle changes. · You are thinking of trying a device such as CPAP. · You are having problems using a CPAP or similar machine. Where can you learn more? Go to [employer]. Enter E497 in the search box to learn more about \"Sleep Apnea: After Your Visit. \"   © 3397-3575 Healthwise, Incorporated. Care instructions adapted under license by [facility] [employer] (which disclaims liability or warranty for this information). This care instruction is for use with your licensed healthcare professional. If you have questions about a medical condition or this instruction, always ask your healthcare professional. Luther Gave any warranty or liability for your use of this information. PROPER SLEEP HYGIENE    What to avoid  · Do not have drinks with caffeine, such as coffee or black tea, for 8 hours before bed. · Do not smoke or use other types of tobacco near bedtime. Nicotine is a stimulant and can keep you awake. · Avoid drinking alcohol late in the evening, because it can cause you to wake in the middle of the night. · Do not eat a big meal close to bedtime. If you are hungry, eat a light snack. · Do not drink a lot of water close to bedtime, because the need to urinate may wake you up during the night. · Do not read or watch TV in bed. Use the bed only for sleeping and sexual activity. What to try  · Go to bed at the same time every night, and wake up at the same time every morning. Do not take naps during the day. · Keep your bedroom quiet, dark, and cool. · Get regular exercise, but not within 3 to 4 hours of your bedtime. .  · Sleep on a comfortable pillow and mattress.   · If watching the clock makes you anxious, turn it facing away from you so you cannot see the time. · If you worry when you lie down, start a worry book. Well before bedtime, write down your worries, and then set the book and your concerns aside. · Try meditation or other relaxation techniques before you go to bed. · If you cannot fall asleep, get up and go to another room until you feel sleepy. Do something relaxing. Repeat your bedtime routine before you go to bed again. · Make your house quiet and calm about an hour before bedtime. Turn down the lights, turn off the TV, log off the computer, and turn down the volume on music. This can help you relax after a busy day. Drowsy Driving  The [address] [place] [address] Road Traffic Safety Administration cites drowsiness as a causing factor in more than 778,366 police reported crashes annually, resulting in 76,000 injuries and 1,500 deaths. Other surveys suggest 55% of people polled have driven while drowsy in the past year, 23% had fallen asleep but not crashed, 3% crashed, and 2% had and accident due to drowsy driving. Who is at risk? Young Drivers: One study of drowsy driving accidents states that 55% of the drivers were under 25 years. Of those, 75% were male. Shift Workers and Travelers: People who work overnight or travel across time zones frequently are at higher risk of experiencing Circadian Rhythm Disorders. They are trying to work and function when their body is programed to sleep. Sleep Deprived: Lack of sleep has a serious impact on your ability to pay attention or focus on a task. Consistently getting less than the average of 8 hours your body needs creates partial or cumulative sleep deprivation. Untreated Sleep Disorders: Sleep Apnea, Narcolepsy, R.L.S., and other sleep disorders (untreated) prevent a person from getting enough restful sleep. This leads to excessive daytime sleepiness and increases the risk for drowsy driving accidents by up to 7 times.   Medications / Alcohol: Even over the counter medications can cause drowsiness. Medications that impair a drivers attention should have a warning label. Alcohol naturally makes you sleepy and on its own can cause accidents. Combined with excessive drowsiness its effects are amplified. Signs of Drowsy Driving:   * You don't remember driving the last few miles   * You may drift out of your arnold   * You are unable to focus and your thoughts wander   * You may yawn more often than normal   * You have difficulty keeping your eyes open / nodding off   * Missing traffic signs, speeding, or tailgating  Prevention-   Good sleep hygiene, lifestyle and behavioral choices have the most impact on drowsy driving. There is no substitute for sleep and the average person requires 8 hours nightly. If you find yourself driving drowsy, stop and sleep. Consider the sleep hygiene tips provided during your visit as well. Medication Refill Policy: Refills for all medications require 1 week advance notice. Please have your pharmacy fax a refill request. We are unable to fax, or call in \"controled substance\" medications and you will need to pick these prescriptions up from our office. [facility] Activation    Thank you for requesting access to [facility]. Please follow the instructions below to securely access and download your online medical record. [facility] allows you to send messages to your doctor, view your test results, renew your prescriptions, schedule appointments, and more. How Do I Sign Up? 1. In your internet browser, go to [email or web address]. 2. Click on the First Time User? Click Here link in the Sign In box. You will see the New Member Sign Up page. 3. Enter your [facility] Access Code exactly as it appears below. You will not need to use this code after youve completed the sign-up process. If you do not sign up before the expiration date, you must request a new code.     [facility] Access Code: [email or web address]  Expires: 1/13/2022  2:44 PM (This is the date your [facility] access code will )    4. Enter the last four digits of your Social Security Number (xxxx) and Date of Birth (mm/dd/yyyy) as indicated and click Submit. You will be taken to the next sign-up page. 5. Create a [employer] ID. This will be your [facility] login ID and cannot be changed, so think of one that is secure and easy to remember. 6. Create a [facility] password. You can change your password at any time. 7. Enter your Password Reset Question and Answer. This can be used at a later time if you forget your password. 8. Enter your e-mail address. You will receive e-mail notification when new information is available in [address] 9. Click Sign Up. You can now view and download portions of your medical record. 10. Click the Download Summary menu link to download a portable copy of your medical information. Additional Information    If you have questions, please call [phone]. Remember, [facility] is NOT to be used for urgent needs. For medical emergencies, dial 911.

## 2021-11-30 NOTE — PROGRESS NOTES
217 [facility]., [place]. [place], [address]  Tel.  [phone]  Fax. 25[address] [facility]  [place], [address] [facility]  Tel.  [phone]  Fax. [phone] [address]  Tel.  [phone]  Fax. [address] [place] (: 1945) is a 64 y.o. female, established patient, seen for positive airway pressure follow-up and evaluation of the following chief complaint(s):   Sleep Apnea (annual f/u)       Blanche No was last seen by me on 20, prior notes reviewed in detail. Home Sleep Apnea Test (HSAT) performed on 2018 showed an AHI of 25.6/hr with a lowest SpO2 of 85%, duration of SpO2 < 88% 0.2 minutes. ASSESSMENT/PLAN:    ICD-10-CM ICD-9-CM    1. MARIUSZ (obstructive sleep apnea)  G47.33 327.23 AMB SUPPLY ORDER      SLEEP LAB (PAP TITRATION)   2. Essential hypertension  I10 401.9    3. H/O anxiety disorder  Z86.59 V11.8    4. BMI 28.0-28.9,adult  Z68.28 V85.24    5. Encounter for screening for COVID-19  Z11.52 V73.89 NOVEL CORONAVIRUS (COVID-19)      NOVEL CORONAVIRUS (COVID-19)       On ResMed:  AirSense 10 AutoSet    Settings:  CPAP: 6 cmH2O    EPR: Off    1. Sleep Apnea -   * She is adherent with PAP therapy and PAP continues to benefit patient and remains necessary for control of her sleep apnea. Continue on current pressures    * Supplies for his device were ordered as noted below:    Orders Placed This Encounter    AMB SUPPLY ORDER     Diagnosis: Obstructive Sleep Apnea ICD-10 Code (G47.33)    Positive Airway Pressure Therapy: Duration of need: 99 months. APAP Device with Heated Humidifer [id number] / [id number]. Minimum Pressure: 6 cmH2O, Maximum Pressure: 9 cmH2O.     Nasal Pillows Combo Mask (Replace) 2 per month.  Nasal Pillows (Replace) 2 per month.  Full Face Mask 1 every 3 months.  Full Face Mask Cushion 1 per month.  Nasal Cushion (Replace) 2 per month.    Nasal Interface Mask 1 every 3 months.  Headgear 1 every 6 months.  Chinstrap 1 every 6 months.  Tubing 1 every 3 months.  Filter(s) Disposable 2 per month.  Filter(s) Non-Disposable 1 every 6 months. .   [address] for Humidifier (Replace) 1 every 6 months. Perform Mask Fitting per patient preference and comfort - replace as above. Ericka Diamond MD, FAASM; NPI: [id number]  Electronically signed. 11/30/21    NOVEL CORONAVIRUS (COVID-19)     Standing Status:   Future     Number of Occurrences:   1     Standing Expiration Date:   2/28/2022     Scheduling Instructions:      1) Due to current limited availability of the COVID-19 PCR test, tests will be prioritized and may not be completed.              2) Order only if the test result will change clinical management or necessary for a return to mission-critical employment decision.              3) Print and instruct patient to adhere to [facility] home isolation program. (Link Above)              4) Set up or refer patient for a monitoring program.              5) Have patient sign up for and leverage [employer] (if not previously done). Order Specific Question:   Status     Answer:   Asymptomatic/Surveillance(e.g. pre-op/pre-procedure/pre-delivery/transfer)     Order Specific Question:   Reason for Test     Answer:   Upcoming elective surgery/procedure/delivery, return to work, or discharge to another facility    SLEEP LAB (PAP TITRATION)     Standing Status:   Future     Standing Expiration Date:   2/28/2022     Scheduling Instructions:      Permit spouse to spend night with patient     Order Specific Question:   Reason for Exam     Answer:   MARIUSZ       * Counseling was provided regarding the importance of regular PAP use with emphasis on ensuring sufficient total sleep time, proper sleep hygiene, and safe driving. * Re-enforced proper and regular cleaning for the device.     * She was asked to contact our office for any problems regarding PAP therapy. 2. Recommended a dedicated weight loss program through appropriate diet and exercise regimen as significant weight reduction has been shown to reduce severity of obstructive sleep apnea. Follow-up and Dispositions    · Return for telephone follow-up after testing is completed. SUBJECTIVE/OBJECTIVE:    She  is seen today for follow up on PAP device and reports no problems using the device. The following concerns identified:    Drowsiness no Problems exhaling no   Snoring no Forget to put on no   Mask Comfortable yes Can't fall asleep no   Dry Mouth no Mask falls off no   Air Leaking no Frequent awakenings no       She admits that her sleep has significantly improved on PAP therapy using nasal pillow mask and non heated tubing. Review of device download indicated:    Usage Days >= 4 hours 93 %  Median Usage  4 hour 50 minutes    Set Device Pressure 6 cmH2O    Median Leak 0.4 L/min  95% Leak 3.4 L/min    Average AHI: 0.3 /hr which reflects improved sleep breathing condition. John Day Sleepiness Score: 4   Modified F.O.S.Q. Score Total / 2: 18.5       Sleep Review of Systems: notable for Negative difficulty falling asleep; Negative awakenings at night; Negative  early morning headaches; Negative  memory problems; Negative  concentration issues; Negative  chest pain; Negative  shortness of breath;  Negative rashes or itching; Negative heartburn / belching / flatulence; Negative  significant mood issues; No afternoon naps per week.       Visit Vitals  /60 (BP 1 Location: Left upper arm)   Pulse 81   Temp 98.2 °F (36.8 °C) (Temporal)   Resp 14   Ht 5' 1\" (1.549 m)   Wt 151 lb (68.5 kg)   SpO2 98%   BMI 28.53 kg/m²         General:   Not in acute distress   Eyes:  Anicteric sclerae, no obvious strabismus   Nose:  No obvious nasal septum deviation    Oropharynx:   Class 4 oropharyngeal outlet, thick tongue base, uvula not seen due to low-lying soft palate, narrow tonsilo-pharyngeal pilars   Tonsils:   tonsils are not visualized due to low-lying soft palate   Neck:   midline trachea   Chest/Lungs:  Equal lung expansion, clear on auscultation    CVS:  Normal rate, regular rhythm; no JVD   Skin:  Warm to touch; no obvious rashes   Neuro:  No focal deficits ; no obvious tremor    Psych:  Normal affect,  normal countenance; An electronic signature was used to authenticate this note. Alicja Dial MD, FAASM  Electronically signed.  11/30/21

## 2021-12-01 ENCOUNTER — TELEPHONE (OUTPATIENT)
Dept: SLEEP MEDICINE | Age: 76
End: 2021-12-01

## 2021-12-01 NOTE — TELEPHONE ENCOUNTER
Patient phoned the office requesting that her order be sent to a new DME company. She was asked to reach out to her insurance company to find a company that is in network. Upon receiving that information we will forward her order to her company of choice.

## 2021-12-06 ENCOUNTER — DOCUMENTATION ONLY (OUTPATIENT)
Dept: SLEEP MEDICINE | Age: 76
End: 2021-12-06

## 2021-12-22 ENCOUNTER — OFFICE VISIT (OUTPATIENT)
Dept: URGENT CARE | Age: 76
End: 2021-12-22
Payer: MEDICARE

## 2021-12-22 VITALS — TEMPERATURE: 98.4 F | OXYGEN SATURATION: 95 % | HEART RATE: 79 BPM | RESPIRATION RATE: 16 BRPM

## 2021-12-22 DIAGNOSIS — J02.9 SORE THROAT: ICD-10-CM

## 2021-12-22 DIAGNOSIS — U07.1 COVID-19: Primary | ICD-10-CM

## 2021-12-22 DIAGNOSIS — R05.9 COUGH: ICD-10-CM

## 2021-12-22 LAB
FLUAV+FLUBV AG NOSE QL IA.RAPID: NEGATIVE
FLUAV+FLUBV AG NOSE QL IA.RAPID: NEGATIVE
S PYO AG THROAT QL: NEGATIVE
SARS-COV-2 POC: POSITIVE
VALID INTERNAL CONTROL?: YES
VALID INTERNAL CONTROL?: YES

## 2021-12-22 PROCEDURE — 87880 STREP A ASSAY W/OPTIC: CPT | Performed by: NURSE PRACTITIONER

## 2021-12-22 PROCEDURE — G8400 PT W/DXA NO RESULTS DOC: HCPCS | Performed by: NURSE PRACTITIONER

## 2021-12-22 PROCEDURE — 1101F PT FALLS ASSESS-DOCD LE1/YR: CPT | Performed by: NURSE PRACTITIONER

## 2021-12-22 PROCEDURE — 87804 INFLUENZA ASSAY W/OPTIC: CPT | Performed by: NURSE PRACTITIONER

## 2021-12-22 PROCEDURE — G8756 NO BP MEASURE DOC: HCPCS | Performed by: NURSE PRACTITIONER

## 2021-12-22 PROCEDURE — 1090F PRES/ABSN URINE INCON ASSESS: CPT | Performed by: NURSE PRACTITIONER

## 2021-12-22 PROCEDURE — G8432 DEP SCR NOT DOC, RNG: HCPCS | Performed by: NURSE PRACTITIONER

## 2021-12-22 PROCEDURE — 99203 OFFICE O/P NEW LOW 30 MIN: CPT | Performed by: NURSE PRACTITIONER

## 2021-12-22 PROCEDURE — G8427 DOCREV CUR MEDS BY ELIG CLIN: HCPCS | Performed by: NURSE PRACTITIONER

## 2021-12-22 PROCEDURE — G8536 NO DOC ELDER MAL SCRN: HCPCS | Performed by: NURSE PRACTITIONER

## 2021-12-22 PROCEDURE — 87426 SARSCOV CORONAVIRUS AG IA: CPT | Performed by: NURSE PRACTITIONER

## 2021-12-22 PROCEDURE — G8419 CALC BMI OUT NRM PARAM NOF/U: HCPCS | Performed by: NURSE PRACTITIONER

## 2021-12-22 NOTE — PROGRESS NOTES
This patient was seen at [address] [place] [address] [facility]gent [facility] while in their vehicle due to COVID-19 pandemic with PPE and focused examination in order to decrease community viral transmission. The patient/guardian gave verbal consent to treat. Amber Kelley is a 64 y.o. female who presents for evaluation of cough and sore throat x 2 days. Denies any symptoms such as SOB, chest tightness, congestion, HA, n/v/d, fever etc. No known exposure to COVID or sick contacts. No other complaints or concerns at this time. Patient has not been vaccinated for COVID. PMH: Atrial fibrillation HLD, HTN, MARIUSZ. Non-smoker. Past Medical History:   Diagnosis Date    Anxiety     Arrhythmia     palpitations    Arthritis     Atrial fibrillation (HCC)     Diverticulosis     Fibrocystic breast changes 2011    GERD (gastroesophageal reflux disease)     Hiatal hernia     High cholesterol     Hypertension     Long term current use of anticoagulant therapy     Migraine     MARIUSZ (obstructive sleep apnea) 2009    AHI: 15 per hour    Other ill-defined conditions(799.89)     VULVA-BURNING    Unspecified sleep apnea     uses c-pap        Past Surgical History:   Procedure Laterality Date    HX APPENDECTOMY      HX CHOLECYSTECTOMY      HX GYN       x 3, hysterectomy    HX ORTHOPAEDIC      L knee scope    HX ORTHOPAEDIC      left foot bunionectomy    HX RECTOCELE REPAIR      HX UROLOGICAL      BLADDER TACKING    WA BREAST SURGERY PROCEDURE UNLISTED      left breast lumpectomy         History reviewed. No pertinent family history.      Social History     Socioeconomic History    Marital status:      Spouse name: Not on file    Number of children: Not on file    Years of education: Not on file    Highest education level: Not on file   Occupational History    Not on file   Tobacco Use    Smoking status: Never Smoker    Smokeless tobacco: Never Used   Substance and Sexual Activity    Alcohol use: No    Drug use: No    Sexual activity: Yes     Partners: Male   Other Topics Concern    Not on file   Social History Narrative    Not on file     Social Determinants of Health     Financial Resource Strain:     Difficulty of Paying Living Expenses: Not on file   Food Insecurity:     Worried About Running Out of Food in the Last Year: Not on file    [place] of Food in the Last Year: Not on file   Transportation Needs:     Lack of Transportation (Medical): Not on file    Lack of Transportation (Non-Medical): Not on file   Physical Activity:     Days of Exercise per Week: Not on file    Minutes of Exercise per Session: Not on file   Stress:     Feeling of Stress : Not on file   Social Connections:     Frequency of Communication with Friends and Family: Not on file    Frequency of Social Gatherings with Friends and Family: Not on file    Attends Jainism Services: Not on file    Active Member of [address] [place] [address] or Organizations: Not on file    Attends Club or Organization Meetings: Not on file    Marital Status: Not on file   Intimate Partner Violence:     Fear of Current or Ex-Partner: Not on file    Emotionally Abused: Not on file    Physically Abused: Not on file    Sexually Abused: Not on file   Housing Stability:     Unable to Pay for Housing in the Last Year: Not on file    Number of [place] in the Last Year: Not on file    Unstable Housing in the Last Year: Not on file                ALLERGIES: Latex, Adhesive tape-silicones, Morphine, Codeine, Crestor [rosuvastatin], Dicyclomine, Iodine and iodide containing products, Lipitor [atorvastatin], Olux [clobetasol], Other medication, and Pcn [penicillins]    Review of Systems   Constitutional: Negative for activity change, appetite change, chills, diaphoresis, fatigue and fever. HENT: Positive for sore throat. Negative for congestion, ear pain, rhinorrhea, sinus pressure and sinus pain. Respiratory: Positive for cough.  Negative for chest tightness, shortness of breath and wheezing. Cardiovascular: Negative for chest pain. Gastrointestinal: Negative for abdominal pain, constipation, diarrhea, nausea and vomiting. Musculoskeletal: Negative for myalgias. Skin: Negative for rash. Neurological: Negative for dizziness, weakness, light-headedness and headaches. Vitals:    12/22/21 1436   Pulse: 79   Resp: 16   Temp: 98.4 °F (36.9 °C)   SpO2: 95%       Physical Exam  Vitals and nursing note reviewed. Constitutional:       General: She is not in acute distress. Appearance: Normal appearance. She is not ill-appearing. HENT:      Head: Normocephalic and atraumatic. Mouth/Throat:      Mouth: Mucous membranes are moist.      Pharynx: Oropharynx is clear. No oropharyngeal exudate or posterior oropharyngeal erythema. Eyes:      Conjunctiva/sclera: Conjunctivae normal.      Pupils: Pupils are equal, round, and reactive to light. Cardiovascular:      Rate and Rhythm: Normal rate and regular rhythm. Heart sounds: Normal heart sounds. No murmur heard. Pulmonary:      Effort: Pulmonary effort is normal.      Breath sounds: Normal breath sounds. No wheezing or rhonchi. Comments: Speaking in full sentences without noted difficulty   Musculoskeletal:         General: Normal range of motion. Cervical back: Normal range of motion and neck supple. No muscular tenderness. Lymphadenopathy:      Cervical: No cervical adenopathy. Skin:     General: Skin is warm and dry. Findings: No rash. Neurological:      Mental Status: She is alert and oriented to person, place, and time. Psychiatric:         Mood and Affect: Mood normal.         Behavior: Behavior normal.         MDM    Procedures      ICD-10-CM ICD-9-CM   1. UVQJW-03  U07.1 079.89   2. Cough  R05.9 786.2   3.  Sore throat  J02.9 462       Orders Placed This Encounter    AMB POC SARS-COV-2 ANTIGEN     Order Specific Question:   Is this test for diagnosis or screening? Answer:   Diagnosis of ill patient     Order Specific Question:   Symptomatic for COVID-19 as defined by CDC? Answer:   Yes     Order Specific Question:   Date of Symptom Onset     Answer:   12/20/2021     Order Specific Question:   Hospitalized for COVID-19? Answer:   No     Order Specific Question:   Admitted to ICU for COVID-19? Answer:   No     Order Specific Question:   Employed in healthcare setting? Answer:   Unknown     Order Specific Question:   Resident in a congregate (group) care setting? Answer:   Unknown     Order Specific Question:   Pregnant? Answer:   No     Order Specific Question:   Previously tested for COVID-19? Answer: Yes    AMB POC [place] INFLUENZA A/B TEST    AMB POC RAPID STREP A      Results for orders placed or performed in visit on 12/22/21   AMB POC SARS-COV-2   Result Value Ref Range    SARS-COV-2 POC Positive (A) Negative   AMB POC [place] INFLUENZA A/B TEST   Result Value Ref Range    VALID INTERNAL CONTROL POC Yes     Influenza A Ag POC Negative Negative    Influenza B Ag POC Negative Negative   AMB POC RAPID STREP A   Result Value Ref Range    VALID INTERNAL CONTROL POC Yes     Group A Strep Ag Negative Negative     Quarantine recommendations reviewed per CDC guidelines. Encouraged deep breathing exercises, ambulation, Tylenol/motrin prn. Increase fluids with electrolytes    The patient is to follow up with PCP PRN. Red flag signs and symptoms discussed with patient/parent indicated need for ED evaluation and treatment.     Signed By: Adelina Hummel NP     December 22, 2021

## 2022-03-18 PROBLEM — I48.0 PAF (PAROXYSMAL ATRIAL FIBRILLATION) (HCC): Status: ACTIVE | Noted: 2017-12-13

## 2022-03-18 PROBLEM — I49.9 IRREGULAR HEARTBEAT: Status: ACTIVE | Noted: 2018-04-20

## 2022-03-24 ENCOUNTER — TELEPHONE (OUTPATIENT)
Dept: SLEEP MEDICINE | Age: 77
End: 2022-03-24

## 2022-04-25 ENCOUNTER — TELEPHONE (OUTPATIENT)
Dept: SLEEP MEDICINE | Age: 77
End: 2022-04-25

## 2022-04-25 NOTE — TELEPHONE ENCOUNTER
Patient stated she will only have the sleep study here if her  can stay with her if her can not she wants a HST

## 2022-04-29 ENCOUNTER — DOCUMENTATION ONLY (OUTPATIENT)
Dept: SLEEP MEDICINE | Age: 77
End: 2022-04-29

## 2022-05-04 ENCOUNTER — TELEPHONE (OUTPATIENT)
Dept: SLEEP MEDICINE | Age: 77
End: 2022-05-04

## 2022-05-04 DIAGNOSIS — G47.33 OSA (OBSTRUCTIVE SLEEP APNEA): Primary | ICD-10-CM

## 2022-05-04 NOTE — TELEPHONE ENCOUNTER
Orders Placed This Encounter    SLEEP LAB (PAP TITRATION)     Standing Status:   Future     Standing Expiration Date:   11/4/2022     Order Specific Question:   Reason for Exam     Answer:   MARIUSZ

## 2022-05-11 ENCOUNTER — TELEPHONE (OUTPATIENT)
Dept: SLEEP MEDICINE | Age: 77
End: 2022-05-11

## 2022-05-15 ENCOUNTER — HOSPITAL ENCOUNTER (OUTPATIENT)
Dept: SLEEP MEDICINE | Age: 77
Discharge: HOME | End: 2022-05-15
Payer: MEDICARE

## 2022-05-15 DIAGNOSIS — G47.33 OSA (OBSTRUCTIVE SLEEP APNEA): ICD-10-CM

## 2022-05-15 PROCEDURE — 95811 POLYSOM 6/>YRS CPAP 4/> PARM: CPT | Performed by: INTERNAL MEDICINE

## 2022-05-16 VITALS
TEMPERATURE: 98.8 F | DIASTOLIC BLOOD PRESSURE: 66 MMHG | SYSTOLIC BLOOD PRESSURE: 121 MMHG | HEART RATE: 72 BPM | OXYGEN SATURATION: 95 %

## 2022-06-14 ENCOUNTER — TELEPHONE (OUTPATIENT)
Dept: SLEEP MEDICINE | Age: 77
End: 2022-06-14

## 2022-06-14 DIAGNOSIS — G47.33 OSA (OBSTRUCTIVE SLEEP APNEA): Primary | ICD-10-CM

## 2022-06-15 NOTE — TELEPHONE ENCOUNTER
Orders Placed This Encounter    AMB SUPPLY ORDER     Primary Encounter Diagnosis: Obstructive Sleep Apnea  (G47.33)    ResMed Device (AirSense 11) with Heated Humidifer [id number] / [id number]. Positive Airway Pressure Therapy: Duration of need: 99 months. Set Pressure: 06 - 09 cmH2O     Nasal Cushion (Replace) 2 per month.  Nasal Interface Mask 1 every 3 months.  Headgear 1 every 6 months.  Filter(s) Disposable 2 per month.  Filter(s) Non-Disposable 1 every 6 months. [facility] for [place] [facility] Yasir (Replace) 1 every 6 months.  Tubing with heating element 1 every 3 months. Perform Mask Fitting per patient preference and comfort - replace as above. Karie Sow MD, FAASM; NPI: [id number]  Electronically signed. 06/14/22     Patient to be notified that there may be a delay due to current supply chain issues.

## 2022-06-15 NOTE — TELEPHONE ENCOUNTER
Reviewed sleep study results with patient. She expressed understanding. Fax DME order & Schedule 1st adherence visit in 60 to 90 days.

## 2022-06-16 ENCOUNTER — TELEPHONE (OUTPATIENT)
Dept: SLEEP MEDICINE | Age: 77
End: 2022-06-16

## 2022-06-16 NOTE — TELEPHONE ENCOUNTER
Patient forgot what the results were from yesterday. Reviewed with patient and her . Patient will await [facility] phone call.

## 2022-06-20 ENCOUNTER — DOCUMENTATION ONLY (OUTPATIENT)
Dept: SLEEP MEDICINE | Age: 77
End: 2022-06-20

## 2022-11-14 ENCOUNTER — OFFICE VISIT (OUTPATIENT)
Dept: CARDIOLOGY CLINIC | Age: 77
End: 2022-11-14
Payer: MEDICARE

## 2022-11-14 VITALS
HEIGHT: 61 IN | HEART RATE: 73 BPM | RESPIRATION RATE: 18 BRPM | BODY MASS INDEX: 29.27 KG/M2 | SYSTOLIC BLOOD PRESSURE: 136 MMHG | WEIGHT: 155 LBS | DIASTOLIC BLOOD PRESSURE: 80 MMHG | OXYGEN SATURATION: 98 %

## 2022-11-14 DIAGNOSIS — I48.0 PAF (PAROXYSMAL ATRIAL FIBRILLATION) (HCC): Primary | ICD-10-CM

## 2022-11-14 DIAGNOSIS — G47.33 OBSTRUCTIVE SLEEP APNEA (ADULT) (PEDIATRIC): ICD-10-CM

## 2022-11-14 DIAGNOSIS — I10 ESSENTIAL HYPERTENSION, BENIGN: ICD-10-CM

## 2022-11-14 DIAGNOSIS — E78.2 MIXED HYPERLIPIDEMIA: ICD-10-CM

## 2022-11-14 PROCEDURE — G8752 SYS BP LESS 140: HCPCS | Performed by: INTERNAL MEDICINE

## 2022-11-14 PROCEDURE — G8400 PT W/DXA NO RESULTS DOC: HCPCS | Performed by: INTERNAL MEDICINE

## 2022-11-14 PROCEDURE — 1123F ACP DISCUSS/DSCN MKR DOCD: CPT | Performed by: INTERNAL MEDICINE

## 2022-11-14 PROCEDURE — 3078F DIAST BP <80 MM HG: CPT | Performed by: INTERNAL MEDICINE

## 2022-11-14 PROCEDURE — G8536 NO DOC ELDER MAL SCRN: HCPCS | Performed by: INTERNAL MEDICINE

## 2022-11-14 PROCEDURE — G8510 SCR DEP NEG, NO PLAN REQD: HCPCS | Performed by: INTERNAL MEDICINE

## 2022-11-14 PROCEDURE — 1101F PT FALLS ASSESS-DOCD LE1/YR: CPT | Performed by: INTERNAL MEDICINE

## 2022-11-14 PROCEDURE — 3074F SYST BP LT 130 MM HG: CPT | Performed by: INTERNAL MEDICINE

## 2022-11-14 PROCEDURE — 99214 OFFICE O/P EST MOD 30 MIN: CPT | Performed by: INTERNAL MEDICINE

## 2022-11-14 PROCEDURE — 1090F PRES/ABSN URINE INCON ASSESS: CPT | Performed by: INTERNAL MEDICINE

## 2022-11-14 PROCEDURE — G8417 CALC BMI ABV UP PARAM F/U: HCPCS | Performed by: INTERNAL MEDICINE

## 2022-11-14 PROCEDURE — 93000 ELECTROCARDIOGRAM COMPLETE: CPT | Performed by: INTERNAL MEDICINE

## 2022-11-14 PROCEDURE — G8427 DOCREV CUR MEDS BY ELIG CLIN: HCPCS | Performed by: INTERNAL MEDICINE

## 2022-11-14 PROCEDURE — G8754 DIAS BP LESS 90: HCPCS | Performed by: INTERNAL MEDICINE

## 2022-11-14 RX ORDER — DEXLANSOPRAZOLE 60 MG/1
CAPSULE, DELAYED RELEASE ORAL
COMMUNITY

## 2022-11-14 NOTE — LETTER
11/14/2022    Patient: Nunu Marie   YOB: 1945   Date of Visit: 11/14/2022     Rosalio Marmolejo MD  [address]  [address] [address]  Via Fax: [phone]    Dear Rosalio Marmolejo MD,      Thank you for referring Ms. Varsha Titus to [facility] for evaluation. My notes for this consultation are attached. If you have questions, please do not hesitate to call me. I look forward to following your patient along with you.       Sincerely,    Desmond Casper MD

## 2022-11-14 NOTE — PROGRESS NOTES
[address], [place], [address] [place] [address]  [phone]     Subjective:      Althea Braxton is a 76 y.o. female is here for routine f/u. The patient was last seen by us in 2021. She points out that she had right rotator cuff surgery in May 2022 and she is recovering, still in therapy, but feels that she is overall improved. She is considering surgery on the left shoulder as well. Today, the patient denies chest pain/ shortness of breath, orthopnea, PND, LE edema, palpitations, syncope, or presyncope.        Patient Active Problem List    Diagnosis Date Noted    Irregular heartbeat 2018    PAF (paroxysmal atrial fibrillation) ([facility] 2017    Anemia 2016    Hematuria 2015    RLQ abdominal pain 08/15/2012    Obstructive sleep apnea (adult) (pediatric) 2012    History of emotional problems 2012    Mixed hyperlipidemia 2012    Essential hypertension, benign 2012    Atrial fibrillation ([facility] 2012    Fibrocystic breast changes 2011    Arthritis     Palpitations 2011    Tick bite 2011      Quirino Du MD  Past Medical History:   Diagnosis Date    Anxiety     Arrhythmia     palpitations    Arthritis     Atrial fibrillation [facility])     Diverticulosis     Fibrocystic breast changes 2011    GERD (gastroesophageal reflux disease)     Hiatal hernia     High cholesterol     Hypertension     Long term current use of anticoagulant therapy     Migraine     MARIUSZ (obstructive sleep apnea) 2009    AHI: 15 per hour    Other ill-defined conditions(799.89)     VULVA-BURNING    Unspecified sleep apnea     uses c-pap      Past Surgical History:   Procedure Laterality Date    HX APPENDECTOMY      HX CHOLECYSTECTOMY      HX GYN       x 3, hysterectomy    HX ORTHOPAEDIC      L knee scope    HX ORTHOPAEDIC      left foot bunionectomy    HX RECTOCELE REPAIR      HX UROLOGICAL      BLADDER TACKING    CT BREAST SURGERY PROCEDURE UNLISTED  1985    left breast lumpectomy     Allergies   Allergen Reactions    Latex Itching    Adhesive Tape-Silicones Other (comments)     Causes skin to turn red    Morphine Other (comments)     States makes her breathing too slow. Adhesive Rash     Other reaction(s): rash      Iodine Rash     Other reaction(s): Unknown      Codeine Other (comments)     abd pain      Crestor [Rosuvastatin] Itching    Dicyclomine Other (comments)     Chest pain     Iodine And Iodide Containing Products Rash     Caused itching and a rash    Lipitor [Atorvastatin] Myalgia     Joint pain      Olux [Clobetasol] Rash    Other Medication Rash     Itch Relief Mositurizing Lotion    Pcn [Penicillins] Hives      No family history on file. Social History     Socioeconomic History    Marital status:      Spouse name: Not on file    Number of children: Not on file    Years of education: Not on file    Highest education level: Not on file   Occupational History    Not on file   Tobacco Use    Smoking status: Never    Smokeless tobacco: Never   Substance and Sexual Activity    Alcohol use: No    Drug use: No    Sexual activity: Yes     Partners: Male   Other Topics Concern    Not on file   Social History Narrative    Not on file     Social Determinants of Health     Financial Resource Strain: Not on file   Food Insecurity: Not on file   Transportation Needs: Not on file   Physical Activity: Not on file   Stress: Not on file   Social Connections: Not on file   Intimate Partner Violence: Not on file   Housing Stability: Not on file      Current Outpatient Medications   Medication Sig    dexlansoprazole (DEXILANT) 60 mg CpDB capsule (delayed release) 1 capsule    amLODIPine (NORVASC) 5 mg tablet TAKE ONE TABLET BY MOUTH ONCE DAILY    ezetimibe (ZETIA) 10 mg tablet Take 1 Tab by mouth daily.  TAKE ONE TABLET BY MOUTH ONCE DAILY    hydroCHLOROthiazide (HYDRODIURIL) 25 mg tablet TAKE ONE TABLET BY MOUTH ONCE DAILY    metoprolol succinate (TOPROL-XL) 50 mg XL tablet TAKE ONE TABLET BY MOUTH ONCE DAILY    pitavastatin calcium (LIVALO) 4 mg tab tablet TAKE ONE TABLET BY MOUTH ONCE DAILY    cpap machine kit by Does Not Apply route. naproxen (NAPROSYN) 500 mg tablet Take 500 mg by mouth two (2) times daily as needed. diclofenac EC (VOLTAREN) 75 mg EC tablet  (Patient not taking: Reported on 11/14/2022)    aspirin delayed-release 81 mg tablet Take 81 mg by mouth daily. (Patient not taking: Reported on 11/14/2022)     No current facility-administered medications for this visit. Review of Symptoms:  11 systems reviewed, negative other than as stated in the HPI    Physical ExamPhysical Exam:    Vitals:    11/14/22 1323   BP: 136/80   Pulse: 73   Resp: 18   SpO2: 98%   Weight: 155 lb (70.3 kg)   Height: 5' 1\" (1.549 m)     Body mass index is 29.29 kg/m². General PE  Gen:  NAD  Mental Status - Alert. General Appearance - Not in acute distress. HEENT:  PERRL, no carotid bruits or JVD  Chest and Lung Exam   Inspection: Accessory muscles - No use of accessory muscles in breathing. Auscultation:   Breath sounds: - Normal.   Cardiovascular   Inspection: Jugular vein - Bilateral - Inspection Normal.   Palpation/Percussion:   Apical Impulse: - Normal.   Auscultation: Rhythm - Regular. Heart Sounds - S1 WNL and S2 WNL. No S3 or S4. Murmurs & Other Heart Sounds: Auscultation of the heart reveals - No Murmurs. Peripheral Vascular   Upper Extremity: Inspection - Bilateral - No Cyanotic nailbeds or Digital clubbing. Lower Extremity:   Palpation: Edema - Bilateral - No edema. Abdomen:   Soft, non-tender, bowel sounds are active.   Neuro: A&O times 3, CN and motor grossly WNL    Labs:   Lab Results   Component Value Date/Time    Cholesterol, total 164 10/11/2018 08:46 AM    Cholesterol, total 176 12/11/2017 11:43 AM    Cholesterol, total 210 (H) 12/06/2016 01:05 PM    Cholesterol, total 167 05/16/2016 12:07 PM    Cholesterol, total 165 06/25/2015 12:27 PM    HDL Cholesterol 57 10/11/2018 08:46 AM    HDL Cholesterol 49 12/11/2017 11:43 AM    HDL Cholesterol 62 12/06/2016 01:05 PM    HDL Cholesterol 54 05/16/2016 12:07 PM    HDL Cholesterol 54 06/25/2015 12:27 PM    LDL, calculated 86 10/11/2018 08:46 AM    LDL, calculated 96 12/11/2017 11:43 AM    LDL, calculated 114 (H) 12/06/2016 01:05 PM    LDL, calculated 72 05/16/2016 12:07 PM    LDL, calculated 70 06/25/2015 12:27 PM    Triglyceride 103 10/11/2018 08:46 AM    Triglyceride 154 (H) 12/11/2017 11:43 AM    Triglyceride 171 (H) 12/06/2016 01:05 PM    Triglyceride 206 (H) 05/16/2016 12:07 PM    Triglyceride 207 (H) 06/25/2015 12:27 PM     Lab Results   Component Value Date/Time     (H) 05/21/2011 01:00 PM     Lab Results   Component Value Date/Time    Sodium 143 10/11/2018 08:46 AM    Potassium 4.4 10/11/2018 08:46 AM    Chloride 101 10/11/2018 08:46 AM    CO2 27 10/11/2018 08:46 AM    Anion gap 6 05/12/2018 10:17 AM    Glucose 99 10/11/2018 08:46 AM    BUN 16 10/11/2018 08:46 AM    Creatinine 0.73 10/11/2018 08:46 AM    BUN/Creatinine ratio 22 10/11/2018 08:46 AM    GFR est AA 93 10/11/2018 08:46 AM    GFR est non-AA 81 10/11/2018 08:46 AM    Calcium 9.8 10/11/2018 08:46 AM    Bilirubin, total 0.3 10/11/2018 08:46 AM    Alk. phosphatase 68 10/11/2018 08:46 AM    Protein, total 7.1 10/11/2018 08:46 AM    Albumin 4.4 10/11/2018 08:46 AM    Globulin 4.3 (H) 05/12/2018 10:17 AM    A-G Ratio 1.6 10/11/2018 08:46 AM    ALT (SGPT) 22 10/11/2018 08:46 AM       EKG:  Ectopic atrial rhythm       Assessment:          ICD-10-CM ICD-9-CM    1. PAF (paroxysmal atrial fibrillation) (HCC)  I48.0 427.31 AMB POC EKG ROUTINE W/ 12 LEADS, INTER & REP      2. Essential hypertension, benign  I10 401.1       3. Mixed hyperlipidemia  E78.2 272.2       4.  Obstructive sleep apnea (adult) (pediatric)  G47.33 327.23           Orders Placed This Encounter    AMB POC EKG ROUTINE W/ 12 LEADS, INTER & REP     Order Specific Question: Reason for Exam:     Answer:   routine    dexlansoprazole (DEXILANT) 60 mg CpDB capsule (delayed release)     Si capsule        Plan:     PAF  Normal EF mild MR per echo in   Distant history of PAF, without recurrence in a long time  previous Holter monitor in  and then event monitor May 2018 shows no recurrence of A fib   Continue ASA, Toprol Xl 50 mg daily  If she has more than occasional and fleeting palpitations lasting a few seconds, she will call for an event monitor  Low threshold to anticoagulate in the future if recurrent atrial fibrillation. HTN  Controlled with current therapy    HLD  10/19 LDL at 64  Continue zetia and statin therapy  Labs and lipids per PCP--followed by PCP and the patient states it has been at goal     MARIUSZ  Compliant with PAP therapy, followed by Dr Ray Pouch     Precordial pain, resolved  Stress echo negative 2019. Suspected GERD  Prev on Protonix     Counseled on diet and exercise- eventual goal of 30-60 minutes 5-7 times a week as per AHA guidelines. Walking 10-16,000 steps daily, gardening.         Continue current care and f/u in 1 yr       Mary Bonilla MD Anxiety    Asthma    Gout    History of Schizophrenia    HTN (Hypertension)    Hyperlipidemia

## 2023-11-27 ENCOUNTER — TELEPHONE (OUTPATIENT)
Age: 78
End: 2023-11-27

## 2023-11-30 ENCOUNTER — OFFICE VISIT (OUTPATIENT)
Age: 78
End: 2023-11-30
Payer: COMMERCIAL

## 2023-11-30 VITALS
SYSTOLIC BLOOD PRESSURE: 116 MMHG | HEIGHT: 61 IN | BODY MASS INDEX: 29 KG/M2 | HEART RATE: 72 BPM | TEMPERATURE: 98.3 F | DIASTOLIC BLOOD PRESSURE: 64 MMHG | OXYGEN SATURATION: 96 % | WEIGHT: 153.6 LBS

## 2023-11-30 DIAGNOSIS — I10 PRIMARY HYPERTENSION: ICD-10-CM

## 2023-11-30 DIAGNOSIS — G47.33 OSA (OBSTRUCTIVE SLEEP APNEA): Primary | ICD-10-CM

## 2023-11-30 PROCEDURE — 99213 OFFICE O/P EST LOW 20 MIN: CPT | Performed by: INTERNAL MEDICINE

## 2023-11-30 PROCEDURE — 3078F DIAST BP <80 MM HG: CPT | Performed by: INTERNAL MEDICINE

## 2023-11-30 PROCEDURE — 1123F ACP DISCUSS/DSCN MKR DOCD: CPT | Performed by: INTERNAL MEDICINE

## 2023-11-30 PROCEDURE — 3074F SYST BP LT 130 MM HG: CPT | Performed by: INTERNAL MEDICINE

## 2023-11-30 ASSESSMENT — SLEEP AND FATIGUE QUESTIONNAIRES
HOW LIKELY ARE YOU TO NOD OFF OR FALL ASLEEP WHILE SITTING QUIETLY AFTER LUNCH WITHOUT ALCOHOL: 2
HOW LIKELY ARE YOU TO NOD OFF OR FALL ASLEEP IN A CAR, WHILE STOPPED FOR A FEW MINUTES IN TRAFFIC: 0
HOW LIKELY ARE YOU TO NOD OFF OR FALL ASLEEP WHILE WATCHING TV: 2
HOW LIKELY ARE YOU TO NOD OFF OR FALL ASLEEP WHILE SITTING INACTIVE IN A PUBLIC PLACE: 1
HOW LIKELY ARE YOU TO NOD OFF OR FALL ASLEEP WHEN YOU ARE A PASSENGER IN A CAR FOR AN HOUR WITHOUT A BREAK: 0
HOW LIKELY ARE YOU TO NOD OFF OR FALL ASLEEP WHILE LYING DOWN TO REST IN THE AFTERNOON WHEN CIRCUMSTANCES PERMIT: 2
HOW LIKELY ARE YOU TO NOD OFF OR FALL ASLEEP WHILE SITTING AND TALKING TO SOMEONE: 0
ESS TOTAL SCORE: 9
HOW LIKELY ARE YOU TO NOD OFF OR FALL ASLEEP WHILE SITTING AND READING: 2

## 2023-11-30 NOTE — PATIENT INSTRUCTIONS
[address] [facility].[place], [address]  Tel.  [phone]  Fax. [address] [facility], [address] [place] [address]  Tel.  [phone]  Fax. [phone] [facility], [address] [facility]  Tel.  [phone]  Fax. [phone]     Learning About CPAP for Sleep Apnea  What is CPAP? CPAP is a small machine that you use at home every night while you sleep. It increases air pressure in your throat to keep your airway open. When you have sleep apnea, this can help you sleep better so you feel much better. CPAP stands for \"continuous positive airway pressure. \"  The CPAP machine will have one of the following:  A mask that covers your nose and mouth  Prongs that fit into your nose  A mask that covers your nose only, the most common type. This type is called NCPAP. The N stands for \"nasal.\"  Why is it done? CPAP is usually the best treatment for obstructive sleep apnea. It is the first treatment choice and the most widely used. Your doctor may suggest CPAP if you have: Moderate to severe sleep apnea. Sleep apnea and coronary artery disease (CAD) or heart failure. How does it help? CPAP can help you have more normal sleep, so you feel less sleepy and more alert during the daytime. CPAP may help keep heart failure or other heart problems from getting worse. NCPAP may help lower your blood pressure. If you use CPAP, your bed partner may also sleep better because you are not snoring or restless. What are the side effects? Some people who use CPAP have:  A dry or stuffy nose and a sore throat. Irritated skin on the face. Sore eyes. Bloating. If you have any of these problems, work with your doctor to fix them. Here are some things you can try:  Be sure the mask or nasal prongs fit well. See if your doctor can adjust the pressure of your CPAP. If your nose is dry, try a humidifier.   If your nose is runny or stuffy, try decongestant medicine or a steroid

## 2023-12-12 ENCOUNTER — OFFICE VISIT (OUTPATIENT)
Age: 78
End: 2023-12-12

## 2023-12-12 VITALS
HEIGHT: 61 IN | BODY MASS INDEX: 28.7 KG/M2 | RESPIRATION RATE: 18 BRPM | WEIGHT: 152 LBS | DIASTOLIC BLOOD PRESSURE: 72 MMHG | SYSTOLIC BLOOD PRESSURE: 136 MMHG | OXYGEN SATURATION: 97 % | HEART RATE: 77 BPM

## 2023-12-12 DIAGNOSIS — R00.2 HEART PALPITATIONS: ICD-10-CM

## 2023-12-12 DIAGNOSIS — I48.0 PAROXYSMAL ATRIAL FIBRILLATION (HCC): Primary | ICD-10-CM

## 2023-12-12 DIAGNOSIS — E78.2 MIXED HYPERLIPIDEMIA: ICD-10-CM

## 2023-12-12 DIAGNOSIS — G47.33 OBSTRUCTIVE SLEEP APNEA (ADULT) (PEDIATRIC): ICD-10-CM

## 2023-12-12 DIAGNOSIS — I10 ESSENTIAL (PRIMARY) HYPERTENSION: ICD-10-CM

## 2023-12-12 RX ORDER — CYANOCOBALAMIN (VITAMIN B-12) 2500 MCG
TABLET, SUBLINGUAL SUBLINGUAL
COMMUNITY

## 2023-12-12 ASSESSMENT — PATIENT HEALTH QUESTIONNAIRE - PHQ9
1. LITTLE INTEREST OR PLEASURE IN DOING THINGS: 0
SUM OF ALL RESPONSES TO PHQ QUESTIONS 1-9: 0
2. FEELING DOWN, DEPRESSED OR HOPELESS: 0
SUM OF ALL RESPONSES TO PHQ QUESTIONS 1-9: 0
SUM OF ALL RESPONSES TO PHQ9 QUESTIONS 1 & 2: 0

## 2023-12-12 NOTE — PROGRESS NOTES
[address] [place] [address] [phone]    [address] [place] [address]     Subjective:        Jamee Wilson is a 78 y.o. female is here for routine f/u.  Last seen by us in 2022.  Since last year, palpitations increasing in frequency with palpitations lasting minutes at times.  The patient denies chest pain/ shortness of breath, orthopnea, PND, LE edema, syncope, presyncope or fatigue.    Patient Active Problem List    Diagnosis Date Noted    Irregular heartbeat 2018    PAF (paroxysmal atrial fibrillation) (HCC) 2017    Anemia 2016    Hematuria 2015    RLQ abdominal pain 08/15/2012    Obstructive sleep apnea (adult) (pediatric) 2012    Atrial fibrillation (HCC) 2012    Essential hypertension, benign 2012    History of emotional problems 2012    Mixed hyperlipidemia 2012    Fibrocystic breast changes 2011    Arthritis     Tick bite 2011    Palpitations 2011      ANTONIO Lawrence MD  Past Medical History:   Diagnosis Date    Anxiety     Arrhythmia     palpitations    Arthritis     Atrial fibrillation (HCC)     Diverticulosis     Fibrocystic breast changes 2011    GERD (gastroesophageal reflux disease)     Hiatal hernia     High cholesterol     Hypertension     Long term current use of anticoagulant therapy     Migraine     MARLEN (obstructive sleep apnea) 2009    AHI: 15 per hour    Other ill-defined conditions(799.89)     VULVA-BURNING    Unspecified sleep apnea     uses c-pap      Past Surgical History:   Procedure Laterality Date    APPENDECTOMY      BREAST SURGERY  1985    left breast lumpectomy    CATARACT REMOVAL      both eyes    CHOLECYSTECTOMY      GYN       x 3, hysterectomy    ORTHOPEDIC SURGERY      L knee scope    ORTHOPEDIC SURGERY      left foot bunionectomy    RECTOCELE REPAIR      SHOULDER SURGERY      UROLOGICAL SURGERY      BLADDER TACKING     Allergies

## 2023-12-13 ENCOUNTER — TELEPHONE (OUTPATIENT)
Age: 78
End: 2023-12-13

## 2023-12-13 NOTE — TELEPHONE ENCOUNTER
This nurse called patient to inform that Holter monitor was requested STAT. Message left to patient with information.

## 2023-12-13 NOTE — TELEPHONE ENCOUNTER
Enrolled with [employer] - Ordered and being shipped to patient's home address on file.   ETA within stat    LAKEISHA Wills Dr. has ordered a 2 weeks extended holter for palpitations

## 2024-01-17 ENCOUNTER — TELEPHONE (OUTPATIENT)
Age: 79
End: 2024-01-17

## 2024-01-17 NOTE — TELEPHONE ENCOUNTER
----- Message from Cleveland Peters MD sent at 1/16/2024 12:18 PM EST -----  Payton-please advise the patient (I believe she speaks Setswana is her primary language but speaks good English as well) that she did have some intermittent fast runs of heartbeat called supraventricular tachycardia.  Not excessively fast, and not dangerous but can be a nuisance.  Metoprolol is an appropriate treatment, but if she is still bothered by these episodes, she may benefit from a procedure called an ablation to eliminate or decrease the frequency.  Please offer appointment with first available electrophysiology consultation to discuss.    Spoke with patient , verified patient with two identifiers, regarding results and recommendations. Patient voiced understanding but stated that she feels okay now,  episodes are not often and will call us back if she feels the an EP is needed.

## 2024-09-06 ENCOUNTER — HOSPITAL ENCOUNTER (OUTPATIENT)
Facility: HOSPITAL | Age: 79
Discharge: HOME | End: 2024-09-09
Payer: MEDICARE

## 2024-09-06 DIAGNOSIS — R10.30 LOWER ABDOMINAL PAIN: ICD-10-CM

## 2024-09-06 PROCEDURE — 74176 CT ABD & PELVIS W/O CONTRAST: CPT

## 2024-11-25 ENCOUNTER — OFFICE VISIT (OUTPATIENT)
Age: 79
End: 2024-11-25
Payer: OTHER GOVERNMENT

## 2024-11-25 VITALS
HEART RATE: 65 BPM | SYSTOLIC BLOOD PRESSURE: 131 MMHG | DIASTOLIC BLOOD PRESSURE: 65 MMHG | WEIGHT: 159.1 LBS | HEIGHT: 61 IN | TEMPERATURE: 97.9 F | BODY MASS INDEX: 30.04 KG/M2 | OXYGEN SATURATION: 98 %

## 2024-11-25 DIAGNOSIS — G47.33 OSA (OBSTRUCTIVE SLEEP APNEA): Primary | ICD-10-CM

## 2024-11-25 DIAGNOSIS — I10 PRIMARY HYPERTENSION: ICD-10-CM

## 2024-11-25 PROCEDURE — 1036F TOBACCO NON-USER: CPT | Performed by: INTERNAL MEDICINE

## 2024-11-25 PROCEDURE — G8484 FLU IMMUNIZE NO ADMIN: HCPCS | Performed by: INTERNAL MEDICINE

## 2024-11-25 PROCEDURE — G8417 CALC BMI ABV UP PARAM F/U: HCPCS | Performed by: INTERNAL MEDICINE

## 2024-11-25 PROCEDURE — 3075F SYST BP GE 130 - 139MM HG: CPT | Performed by: INTERNAL MEDICINE

## 2024-11-25 PROCEDURE — 1123F ACP DISCUSS/DSCN MKR DOCD: CPT | Performed by: INTERNAL MEDICINE

## 2024-11-25 PROCEDURE — 3078F DIAST BP <80 MM HG: CPT | Performed by: INTERNAL MEDICINE

## 2024-11-25 PROCEDURE — 1090F PRES/ABSN URINE INCON ASSESS: CPT | Performed by: INTERNAL MEDICINE

## 2024-11-25 PROCEDURE — 99213 OFFICE O/P EST LOW 20 MIN: CPT | Performed by: INTERNAL MEDICINE

## 2024-11-25 PROCEDURE — G8427 DOCREV CUR MEDS BY ELIG CLIN: HCPCS | Performed by: INTERNAL MEDICINE

## 2024-11-25 PROCEDURE — G8400 PT W/DXA NO RESULTS DOC: HCPCS | Performed by: INTERNAL MEDICINE

## 2024-11-25 RX ORDER — UREA 40 %
CREAM (GRAM) TOPICAL
COMMUNITY
Start: 2024-11-01

## 2024-11-25 ASSESSMENT — SLEEP AND FATIGUE QUESTIONNAIRES
ESS TOTAL SCORE: 8
HOW LIKELY ARE YOU TO NOD OFF OR FALL ASLEEP WHILE SITTING INACTIVE IN A PUBLIC PLACE: SLIGHT CHANCE OF DOZING
HOW LIKELY ARE YOU TO NOD OFF OR FALL ASLEEP WHEN YOU ARE A PASSENGER IN A CAR FOR AN HOUR WITHOUT A BREAK: SLIGHT CHANCE OF DOZING
HOW LIKELY ARE YOU TO NOD OFF OR FALL ASLEEP WHILE LYING DOWN TO REST IN THE AFTERNOON WHEN CIRCUMSTANCES PERMIT: SLIGHT CHANCE OF DOZING
HOW LIKELY ARE YOU TO NOD OFF OR FALL ASLEEP IN A CAR, WHILE STOPPED FOR A FEW MINUTES IN TRAFFIC: WOULD NEVER DOZE
HOW LIKELY ARE YOU TO NOD OFF OR FALL ASLEEP WHILE WATCHING TV: HIGH CHANCE OF DOZING
HOW LIKELY ARE YOU TO NOD OFF OR FALL ASLEEP WHILE SITTING AND READING: MODERATE CHANCE OF DOZING
HOW LIKELY ARE YOU TO NOD OFF OR FALL ASLEEP WHILE SITTING QUIETLY AFTER LUNCH WITHOUT ALCOHOL: WOULD NEVER DOZE
HOW LIKELY ARE YOU TO NOD OFF OR FALL ASLEEP WHILE SITTING AND TALKING TO SOMEONE: WOULD NEVER DOZE

## 2024-11-25 NOTE — PROGRESS NOTES
[address] [address]  Tel.  [phone]    Fax. [phone]     [address],   [place] [address]  Tel.  [phone]    Fax. [phone] [address] [address]  Tel.  [phone]    Fax. [phone]       Jamee Wilson (: 1945) is a 79 y.o. female, established patient, seen for positive airway pressure follow-up and evaluation of the following chief complaint(s):   Sleep Problem (Yearly follow up)       Jamee Wilson was last seen by me on 23, prior notes reviewed in detail.  Home Sleep Apnea Test (HSAT) performed on 2018 showed an  AHI of 25.6/hr with a lowest SpO2 of 85%, duration of SpO2 < 88% 0.2 minutes.     ASSESSMENT/PLAN:   Diagnosis Orders   1. MARLEN (obstructive sleep apnea)  DME Order for (Specify) as OP      2. Primary hypertension        3. BMI 30.0-30.9,adult            On ResMed:  AirSense 11 AutoSet    Settings:  Min EPAP: 06 cmH2O  Max EPAP: 09 cmH2O    EPR: Off    Sleep Apnea -   * She is adherent with PAP therapy and PAP continues to benefit patient and remains necessary for control of her sleep apnea. Continue on current pressures    * Supplies for his device were ordered as noted below:    * She is familiar with the telephone monitoring application, is willing to track therapy and agrees to notify use if AHI is >5 per hour.    * Counseling was provided regarding the importance of regular PAP use with emphasis on ensuring sufficient total sleep time, proper sleep hygiene, and safe driving.    * Re-enforced proper and regular cleaning for the device.    * She was asked to contact our office for any problems regarding PAP therapy.      2. Hypertension -  continue on current regimen, she will continue to monitor her BP and follow up with her primary care provider for reevaluation/adjustment of medications if warranted.  I have reviewed the relationship between hypertension as it relates to

## 2024-12-05 ENCOUNTER — CLINICAL DOCUMENTATION (OUTPATIENT)
Age: 79
End: 2024-12-05

## 2024-12-05 NOTE — PROGRESS NOTES
Elina gave me a message to return a call to Mrs. Wilson. I left a VM for her to call the office back.    Lavonne

## 2024-12-10 ENCOUNTER — CLINICAL DOCUMENTATION (OUTPATIENT)
Age: 79
End: 2024-12-10

## 2025-04-04 ENCOUNTER — TELEPHONE (OUTPATIENT)
Age: 80
End: 2025-04-04

## 2025-04-04 NOTE — TELEPHONE ENCOUNTER
Patient requested a call back due to recent CT scan in [place] with clogged arteries        Contact Information  [phone] (Mobile)   [phone] (Home Phone)

## 2025-04-04 NOTE — TELEPHONE ENCOUNTER
Spoke with patient, verified with 2 identifiers. Patient on her way back from [place].  Ms Steve got  a full body scan in [place] recently, noted some calcification in carotid arteries and \"back of heart\", stated.  Patient requested to be seen sooner. Appointment made for  April 16 at [facility] with FLORENTIN Hightower NP.  Also, reminded to bring all documents/results from latest tests.   Ms Wilson voiced understanding.   Date and address of appointment also mailed to address on file.

## 2025-04-16 ENCOUNTER — OFFICE VISIT (OUTPATIENT)
Age: 80
End: 2025-04-16
Payer: MEDICARE

## 2025-04-16 VITALS
WEIGHT: 157.5 LBS | BODY MASS INDEX: 29.74 KG/M2 | HEART RATE: 74 BPM | HEIGHT: 61 IN | OXYGEN SATURATION: 97 % | DIASTOLIC BLOOD PRESSURE: 68 MMHG | SYSTOLIC BLOOD PRESSURE: 118 MMHG

## 2025-04-16 DIAGNOSIS — I48.0 PAROXYSMAL ATRIAL FIBRILLATION (HCC): ICD-10-CM

## 2025-04-16 DIAGNOSIS — E78.2 MIXED HYPERLIPIDEMIA: ICD-10-CM

## 2025-04-16 DIAGNOSIS — G47.33 OBSTRUCTIVE SLEEP APNEA (ADULT) (PEDIATRIC): ICD-10-CM

## 2025-04-16 DIAGNOSIS — I10 ESSENTIAL HYPERTENSION, BENIGN: ICD-10-CM

## 2025-04-16 DIAGNOSIS — R93.1 AGATSTON CORONARY ARTERY CALCIUM SCORE LESS THAN 100: Primary | ICD-10-CM

## 2025-04-16 PROCEDURE — 99214 OFFICE O/P EST MOD 30 MIN: CPT

## 2025-04-16 PROCEDURE — 1159F MED LIST DOCD IN RCRD: CPT

## 2025-04-16 PROCEDURE — 3078F DIAST BP <80 MM HG: CPT

## 2025-04-16 PROCEDURE — 1090F PRES/ABSN URINE INCON ASSESS: CPT

## 2025-04-16 PROCEDURE — G8400 PT W/DXA NO RESULTS DOC: HCPCS

## 2025-04-16 PROCEDURE — 1123F ACP DISCUSS/DSCN MKR DOCD: CPT

## 2025-04-16 PROCEDURE — G8427 DOCREV CUR MEDS BY ELIG CLIN: HCPCS

## 2025-04-16 PROCEDURE — G8417 CALC BMI ABV UP PARAM F/U: HCPCS

## 2025-04-16 PROCEDURE — 3074F SYST BP LT 130 MM HG: CPT

## 2025-04-16 PROCEDURE — 1036F TOBACCO NON-USER: CPT

## 2025-04-16 PROCEDURE — 1126F AMNT PAIN NOTED NONE PRSNT: CPT

## 2025-04-16 ASSESSMENT — PATIENT HEALTH QUESTIONNAIRE - PHQ9
1. LITTLE INTEREST OR PLEASURE IN DOING THINGS: NOT AT ALL
SUM OF ALL RESPONSES TO PHQ QUESTIONS 1-9: 0
SUM OF ALL RESPONSES TO PHQ QUESTIONS 1-9: 0
2. FEELING DOWN, DEPRESSED OR HOPELESS: NOT AT ALL
SUM OF ALL RESPONSES TO PHQ QUESTIONS 1-9: 0
SUM OF ALL RESPONSES TO PHQ QUESTIONS 1-9: 0

## 2025-04-16 NOTE — PROGRESS NOTES
1. Have you been to the ER, urgent care clinic since your last visit?  Hospitalized since your last visit?  No    2. Have you seen or consulted any other health care providers outside of the [facility] since your last visit?  Include any pap smears or colon screening.   No

## 2025-04-16 NOTE — ASSESSMENT & PLAN NOTE
Remains in sinus rhythm 12/12/2023   Normal EF mild MR per echo in 2014   Distant history of PAF, without recurrence in a long time   previous Holter monitor in 2015 and then event monitor May 2018, again January 2024 shows no recurrence of A fib (short runs of PSVT January 2024 on metoprolol, not bothersome per patient)   Continue ASA, Toprol Xl 50 mg daily  Echo December 2023 LVEF 55-60, no valvular pathology   Low threshold to anticoagulate in the future if recurrent atrial fibrillation.

## 2025-04-16 NOTE — PROGRESS NOTES
Patient: Jamee Wilson  : 1945    Primary Cardiologist:Dr. TIERRA Peters  EP Cardiologist:NONE   PCP: Lenny Metcalf DO    Today's Date: 2025      ASSESSMENT AND PLAN:     Assessment and Plan:  Assessment & Plan  Agatston coronary artery calcium score less than 100  Done in [place] with full body scan as well  Score 99 (86 LAD)  Discussed addressing risk factors, managing cormorbid conditions well, and diet and exercise    Paroxysmal atrial fibrillation (HCC)   Remains in sinus rhythm 2023   Normal EF mild MR per echo in    Distant history of PAF, without recurrence in a long time   previous Holter monitor in  and then event monitor May 2018, again 2024 shows no recurrence of A fib (short runs of PSVT 2024 on metoprolol, not bothersome per patient)   Continue ASA, Toprol Xl 50 mg daily  Echo 2023 LVEF 55-60, no valvular pathology   Low threshold to anticoagulate in the future if recurrent atrial fibrillation.  Obstructive sleep apnea (adult) (pediatric)  Per Dr. Solorzano  CPAP    Essential hypertension, benign  BP/HR look good today  Metoprolol, norvasc  Mixed hyperlipidemia   10/19 LDL at 64   Continue zetia and statin therapy   Labs and lipids per PCP         Follow up with Dr. TIERRA Peters in November as scheduled.       HISTORY OF PRESENT ILLNESS:     History of Present Illness:  Jamee Wilson is a 79 y.o. female     She last saw Dr. Peters in 2024. Per his note: Jamee Wilson is a 79 y.o. female is here for routine f/u.  The patient denies chest pain/ shortness of breath, orthopnea, PND, LE edema, palpitations, syncope, presyncope or fatigue.     Today...    Had CCS in [place] with score 99. Discussed modifying risk factors and good control of comorbidities. Good diet and exercise. Traveling back and forth to [place] for the season. Walks 2 miles a day. They have 32 grandchildren that keep them active. She is doing well without any issues.

## 2025-04-29 ENCOUNTER — TELEPHONE (OUTPATIENT)
Age: 80
End: 2025-04-29

## 2025-04-29 NOTE — TELEPHONE ENCOUNTER
Spoke with patient, verified with 2 identifiers.   This nurse explained to ms Wilson that FOLRENTIN Hightower NP provided a full explanation on Full body scan on last office visit on 04-.  Ms Wilson would like for dr. Peters to review her CD. She will be bringing the CD for dr. Peters to review. No appointment needed for this.  Patient voiced understanding.

## 2025-04-29 NOTE — TELEPHONE ENCOUNTER
Patient is calling because she has not heard from the doctor about the review of her full body scan that was done on her in [place].    Patient would like to speak with the doctor about this.Patient said she gave the paper work to the NP Luli Hightower on DOS 4/16/25 at the [facility].Patient is a little concerned about this.    Patient is asking  to recommend her a new  PCP.Patient states the one she has is to far.    [phone]

## 2025-06-16 ENCOUNTER — HOSPITAL ENCOUNTER (INPATIENT)
Facility: HOSPITAL | Age: 80
LOS: 2 days | Discharge: HOME | DRG: 069 | End: 2025-06-18
Attending: INTERNAL MEDICINE | Admitting: INTERNAL MEDICINE
Payer: MEDICARE

## 2025-06-16 ENCOUNTER — APPOINTMENT (OUTPATIENT)
Facility: HOSPITAL | Age: 80
DRG: 069 | End: 2025-06-16
Payer: MEDICARE

## 2025-06-16 DIAGNOSIS — G45.9 TIA (TRANSIENT ISCHEMIC ATTACK): ICD-10-CM

## 2025-06-16 DIAGNOSIS — R20.2 PARESTHESIAS: ICD-10-CM

## 2025-06-16 DIAGNOSIS — R20.2 PARESTHESIA: Primary | ICD-10-CM

## 2025-06-16 LAB
ALBUMIN SERPL-MCNC: 3.6 G/DL (ref 3.5–5)
ALBUMIN/GLOB SERPL: 0.9 (ref 1.1–2.2)
ALP SERPL-CCNC: 77 U/L (ref 45–117)
ALT SERPL-CCNC: 38 U/L (ref 12–78)
ANION GAP SERPL CALC-SCNC: 5 MMOL/L (ref 2–12)
AST SERPL-CCNC: 40 U/L (ref 15–37)
BASOPHILS # BLD: 0.04 K/UL (ref 0–0.1)
BASOPHILS NFR BLD: 0.5 % (ref 0–1)
BILIRUB SERPL-MCNC: 0.4 MG/DL (ref 0.2–1)
BUN SERPL-MCNC: 21 MG/DL (ref 6–20)
BUN/CREAT SERPL: 23 (ref 12–20)
CALCIUM SERPL-MCNC: 9.1 MG/DL (ref 8.5–10.1)
CHLORIDE SERPL-SCNC: 102 MMOL/L (ref 97–108)
CO2 SERPL-SCNC: 29 MMOL/L (ref 21–32)
CREAT SERPL-MCNC: 0.9 MG/DL (ref 0.55–1.02)
DIFFERENTIAL METHOD BLD: ABNORMAL
EKG ATRIAL RATE: 75 BPM
EKG DIAGNOSIS: NORMAL
EKG P AXIS: 64 DEGREES
EKG P-R INTERVAL: 196 MS
EKG Q-T INTERVAL: 382 MS
EKG QRS DURATION: 80 MS
EKG QTC CALCULATION (BAZETT): 426 MS
EKG R AXIS: 39 DEGREES
EKG T AXIS: 65 DEGREES
EKG VENTRICULAR RATE: 75 BPM
EOSINOPHIL # BLD: 0.55 K/UL (ref 0–0.4)
EOSINOPHIL NFR BLD: 6.6 % (ref 0–7)
ERYTHROCYTE [DISTWIDTH] IN BLOOD BY AUTOMATED COUNT: 14.1 % (ref 11.5–14.5)
GLOBULIN SER CALC-MCNC: 4.2 G/DL (ref 2–4)
GLUCOSE BLD STRIP.AUTO-MCNC: 126 MG/DL (ref 65–117)
GLUCOSE SERPL-MCNC: 131 MG/DL (ref 65–100)
HCT VFR BLD AUTO: 40.5 % (ref 35–47)
HGB BLD-MCNC: 12.6 G/DL (ref 11.5–16)
IMM GRANULOCYTES # BLD AUTO: 0.02 K/UL (ref 0–0.04)
IMM GRANULOCYTES NFR BLD AUTO: 0.2 % (ref 0–0.5)
INR PPP: 1.1 (ref 0.9–1.1)
LYMPHOCYTES # BLD: 2.26 K/UL (ref 0.8–3.5)
LYMPHOCYTES NFR BLD: 26.9 % (ref 12–49)
MCH RBC QN AUTO: 26.1 PG (ref 26–34)
MCHC RBC AUTO-ENTMCNC: 31.1 G/DL (ref 30–36.5)
MCV RBC AUTO: 83.9 FL (ref 80–99)
MONOCYTES # BLD: 0.69 K/UL (ref 0–1)
MONOCYTES NFR BLD: 8.2 % (ref 5–13)
NEUTS SEG # BLD: 4.83 K/UL (ref 1.8–8)
NEUTS SEG NFR BLD: 57.6 % (ref 32–75)
NRBC # BLD: 0 K/UL (ref 0–0.01)
NRBC BLD-RTO: 0 PER 100 WBC
PLATELET # BLD AUTO: 194 K/UL (ref 150–400)
PMV BLD AUTO: 11.9 FL (ref 8.9–12.9)
POTASSIUM SERPL-SCNC: 3.9 MMOL/L (ref 3.5–5.1)
PROT SERPL-MCNC: 7.8 G/DL (ref 6.4–8.2)
PROTHROMBIN TIME: 11.2 SEC (ref 9.2–11.2)
RBC # BLD AUTO: 4.83 M/UL (ref 3.8–5.2)
SERVICE CMNT-IMP: ABNORMAL
SODIUM SERPL-SCNC: 136 MMOL/L (ref 136–145)
TROPONIN I SERPL HS-MCNC: 10 NG/L (ref 0–51)
WBC # BLD AUTO: 8.4 K/UL (ref 3.6–11)

## 2025-06-16 PROCEDURE — 99222 1ST HOSP IP/OBS MODERATE 55: CPT | Performed by: PSYCHIATRY & NEUROLOGY

## 2025-06-16 PROCEDURE — 1100000000 HC RM PRIVATE

## 2025-06-16 PROCEDURE — 85610 PROTHROMBIN TIME: CPT

## 2025-06-16 PROCEDURE — 6370000000 HC RX 637 (ALT 250 FOR IP)

## 2025-06-16 PROCEDURE — 96374 THER/PROPH/DIAG INJ IV PUSH: CPT

## 2025-06-16 PROCEDURE — 70498 CT ANGIOGRAPHY NECK: CPT

## 2025-06-16 PROCEDURE — 2500000003 HC RX 250 WO HCPCS

## 2025-06-16 PROCEDURE — 6360000004 HC RX CONTRAST MEDICATION: Performed by: PHYSICIAN ASSISTANT

## 2025-06-16 PROCEDURE — 97535 SELF CARE MNGMENT TRAINING: CPT

## 2025-06-16 PROCEDURE — 6360000002 HC RX W HCPCS: Performed by: PHYSICIAN ASSISTANT

## 2025-06-16 PROCEDURE — 70551 MRI BRAIN STEM W/O DYE: CPT

## 2025-06-16 PROCEDURE — 80053 COMPREHEN METABOLIC PANEL: CPT

## 2025-06-16 PROCEDURE — 70450 CT HEAD/BRAIN W/O DYE: CPT

## 2025-06-16 PROCEDURE — 82962 GLUCOSE BLOOD TEST: CPT

## 2025-06-16 PROCEDURE — 99285 EMERGENCY DEPT VISIT HI MDM: CPT

## 2025-06-16 PROCEDURE — 6370000000 HC RX 637 (ALT 250 FOR IP): Performed by: PHYSICIAN ASSISTANT

## 2025-06-16 PROCEDURE — 4A03X5D MEASUREMENT OF ARTERIAL FLOW, INTRACRANIAL, EXTERNAL APPROACH: ICD-10-PCS | Performed by: STUDENT IN AN ORGANIZED HEALTH CARE EDUCATION/TRAINING PROGRAM

## 2025-06-16 PROCEDURE — 84484 ASSAY OF TROPONIN QUANT: CPT

## 2025-06-16 PROCEDURE — 97165 OT EVAL LOW COMPLEX 30 MIN: CPT

## 2025-06-16 PROCEDURE — 6360000002 HC RX W HCPCS

## 2025-06-16 PROCEDURE — 85025 COMPLETE CBC W/AUTO DIFF WBC: CPT

## 2025-06-16 PROCEDURE — 0042T CT BRAIN PERFUSION: CPT

## 2025-06-16 PROCEDURE — 92610 EVALUATE SWALLOWING FUNCTION: CPT

## 2025-06-16 PROCEDURE — 36415 COLL VENOUS BLD VENIPUNCTURE: CPT

## 2025-06-16 RX ORDER — CLOPIDOGREL BISULFATE 75 MG/1
300 TABLET ORAL
Status: COMPLETED | OUTPATIENT
Start: 2025-06-16 | End: 2025-06-16

## 2025-06-16 RX ORDER — SODIUM CHLORIDE 9 MG/ML
INJECTION, SOLUTION INTRAVENOUS PRN
Status: DISCONTINUED | OUTPATIENT
Start: 2025-06-16 | End: 2025-06-18 | Stop reason: HOSPADM

## 2025-06-16 RX ORDER — PANTOPRAZOLE SODIUM 40 MG/1
40 TABLET, DELAYED RELEASE ORAL
Status: DISCONTINUED | OUTPATIENT
Start: 2025-06-17 | End: 2025-06-18 | Stop reason: HOSPADM

## 2025-06-16 RX ORDER — SODIUM CHLORIDE 0.9 % (FLUSH) 0.9 %
5-40 SYRINGE (ML) INJECTION PRN
Status: DISCONTINUED | OUTPATIENT
Start: 2025-06-16 | End: 2025-06-18 | Stop reason: HOSPADM

## 2025-06-16 RX ORDER — DIPHENHYDRAMINE HYDROCHLORIDE 50 MG/ML
25 INJECTION, SOLUTION INTRAMUSCULAR; INTRAVENOUS
Status: COMPLETED | OUTPATIENT
Start: 2025-06-16 | End: 2025-06-16

## 2025-06-16 RX ORDER — ASPIRIN 81 MG/1
81 TABLET ORAL DAILY
Status: DISCONTINUED | OUTPATIENT
Start: 2025-06-17 | End: 2025-06-18 | Stop reason: HOSPADM

## 2025-06-16 RX ORDER — ONDANSETRON 4 MG/1
4 TABLET, ORALLY DISINTEGRATING ORAL EVERY 8 HOURS PRN
Status: DISCONTINUED | OUTPATIENT
Start: 2025-06-16 | End: 2025-06-18 | Stop reason: HOSPADM

## 2025-06-16 RX ORDER — ACETAMINOPHEN 325 MG/1
325 TABLET ORAL EVERY 4 HOURS PRN
Status: DISCONTINUED | OUTPATIENT
Start: 2025-06-16 | End: 2025-06-18 | Stop reason: HOSPADM

## 2025-06-16 RX ORDER — SODIUM CHLORIDE 0.9 % (FLUSH) 0.9 %
5-40 SYRINGE (ML) INJECTION EVERY 12 HOURS SCHEDULED
Status: DISCONTINUED | OUTPATIENT
Start: 2025-06-16 | End: 2025-06-18 | Stop reason: HOSPADM

## 2025-06-16 RX ORDER — ONDANSETRON 2 MG/ML
4 INJECTION INTRAMUSCULAR; INTRAVENOUS EVERY 6 HOURS PRN
Status: DISCONTINUED | OUTPATIENT
Start: 2025-06-16 | End: 2025-06-18 | Stop reason: HOSPADM

## 2025-06-16 RX ORDER — EZETIMIBE 10 MG/1
10 TABLET ORAL DAILY
Status: DISCONTINUED | OUTPATIENT
Start: 2025-06-16 | End: 2025-06-18 | Stop reason: HOSPADM

## 2025-06-16 RX ORDER — POLYETHYLENE GLYCOL 3350 17 G/17G
17 POWDER, FOR SOLUTION ORAL DAILY PRN
Status: DISCONTINUED | OUTPATIENT
Start: 2025-06-16 | End: 2025-06-18 | Stop reason: HOSPADM

## 2025-06-16 RX ORDER — ASPIRIN 81 MG/1
81 TABLET, CHEWABLE ORAL ONCE
Status: DISCONTINUED | OUTPATIENT
Start: 2025-06-16 | End: 2025-06-16

## 2025-06-16 RX ORDER — AMLODIPINE BESYLATE 5 MG/1
5 TABLET ORAL DAILY
Status: DISCONTINUED | OUTPATIENT
Start: 2025-06-16 | End: 2025-06-18 | Stop reason: HOSPADM

## 2025-06-16 RX ORDER — METOPROLOL SUCCINATE 50 MG/1
50 TABLET, EXTENDED RELEASE ORAL DAILY
Status: DISCONTINUED | OUTPATIENT
Start: 2025-06-16 | End: 2025-06-18 | Stop reason: HOSPADM

## 2025-06-16 RX ORDER — ENOXAPARIN SODIUM 100 MG/ML
40 INJECTION SUBCUTANEOUS DAILY
Status: DISCONTINUED | OUTPATIENT
Start: 2025-06-16 | End: 2025-06-17

## 2025-06-16 RX ORDER — HYDRALAZINE HYDROCHLORIDE 20 MG/ML
5 INJECTION INTRAMUSCULAR; INTRAVENOUS EVERY 6 HOURS PRN
Status: DISCONTINUED | OUTPATIENT
Start: 2025-06-16 | End: 2025-06-18 | Stop reason: HOSPADM

## 2025-06-16 RX ORDER — HYDROCHLOROTHIAZIDE 25 MG/1
25 TABLET ORAL DAILY
Status: DISCONTINUED | OUTPATIENT
Start: 2025-06-16 | End: 2025-06-18 | Stop reason: HOSPADM

## 2025-06-16 RX ORDER — IOPAMIDOL 755 MG/ML
100 INJECTION, SOLUTION INTRAVASCULAR
Status: COMPLETED | OUTPATIENT
Start: 2025-06-16 | End: 2025-06-16

## 2025-06-16 RX ORDER — CLOPIDOGREL BISULFATE 75 MG/1
75 TABLET ORAL DAILY
Status: DISCONTINUED | OUTPATIENT
Start: 2025-06-17 | End: 2025-06-17

## 2025-06-16 RX ADMIN — ENOXAPARIN SODIUM 40 MG: 100 INJECTION SUBCUTANEOUS at 11:38

## 2025-06-16 RX ADMIN — CLOPIDOGREL BISULFATE 300 MG: 75 TABLET, FILM COATED ORAL at 09:14

## 2025-06-16 RX ADMIN — EZETIMIBE 10 MG: 10 TABLET ORAL at 11:49

## 2025-06-16 RX ADMIN — SODIUM CHLORIDE, PRESERVATIVE FREE 10 ML: 5 INJECTION INTRAVENOUS at 11:38

## 2025-06-16 RX ADMIN — IOPAMIDOL 100 ML: 755 INJECTION, SOLUTION INTRAVENOUS at 08:46

## 2025-06-16 RX ADMIN — DIPHENHYDRAMINE HYDROCHLORIDE 25 MG: 50 INJECTION, SOLUTION INTRAMUSCULAR; INTRAVENOUS at 08:39

## 2025-06-16 ASSESSMENT — PAIN SCALES - GENERAL
PAINLEVEL_OUTOF10: 0
PAINLEVEL_OUTOF10: 0

## 2025-06-16 ASSESSMENT — PAIN - FUNCTIONAL ASSESSMENT: PAIN_FUNCTIONAL_ASSESSMENT: NONE - DENIES PAIN

## 2025-06-16 NOTE — CONSULTS
CONSULT - Neurology      Name:  Jamee Wilson       MRN: [id number]  Location:     Date: 2025  Time:  6:24 PM        Chief Complaint:   Chief Complaint   Patient presents with    Numbness     Patient ambulatory to triage with c/o left sided facial and arm numbness, patient reports she woke up feeling normal and around 6 AM is when she noticed the numbness started.        HPI:  It is a great pleasure to see Jamee Wilson, a 79 y.o. female today in the hospital . Briefly these are the events happened as per the chart H&P and taken from the chart.  The patient experienced left-sided neck and mouth paresthesias that started approximately two hours prior to her arrival at the hospital and resolved upon admission. The symptoms fluctuated in the beginning. The patient was brought to the emergency room for further evaluation.       PAST MEDICAL HISTORY:  Past Medical History:   Diagnosis Date    Anxiety     Arrhythmia     palpitations    Arthritis     Atrial fibrillation (HCC)     Diverticulosis     Fibrocystic breast changes 2011    GERD (gastroesophageal reflux disease)     Hiatal hernia     High cholesterol     Hypertension     Long term current use of anticoagulant therapy     Migraine     MARLEN (obstructive sleep apnea) 2009    AHI: 15 per hour    Other ill-defined conditions(799.89)     VULVA-BURNING    Unspecified sleep apnea     uses c-pap     PAST SURGICAL HISTORY:    Past Surgical History:   Procedure Laterality Date    APPENDECTOMY      BREAST SURGERY  1985    left breast lumpectomy    CATARACT REMOVAL      both eyes    CHOLECYSTECTOMY      GYN       x 3, hysterectomy    ORTHOPEDIC SURGERY      L knee scope    ORTHOPEDIC SURGERY      left foot bunionectomy    RECTOCELE REPAIR      SHOULDER SURGERY      UROLOGICAL SURGERY      BLADDER TACKING     FAMILY HISTORY:  History reviewed. No pertinent family history.  SOCIAL HISTORY:   Social History     Tobacco Use    Smoking status: Never

## 2025-06-16 NOTE — H&P
alcohol or illicit drug use.     In the ED, patient was hypertensive with SBP in the 180s. Sodium 136, potassium 3.9, glucose 131. WBC with absolute eosinophil count of 0.55, otherwise unremarkable. CT head revealed no acute abnormality. CT brain with nonspecific perfusion in the right posterior cranial fossa. 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     We were asked to admit for further work up of the above problems.      Review of Systems - Pertinent ROS described as above.       Past Medical History:   Diagnosis Date   • Anxiety    • Arrhythmia     palpitations   • Arthritis    • Atrial fibrillation (HCC)    • Diverticulosis    • Fibrocystic breast changes 2011   • GERD (gastroesophageal reflux disease)    • Hiatal hernia    • High cholesterol    • Hypertension    • Long term current use of anticoagulant therapy    • Migraine    • MARLEN (obstructive sleep apnea) 2009    AHI: 15 per hour   • Other ill-defined conditions(799.89)     VULVA-BURNING   • Unspecified sleep apnea     uses c-pap      Past Surgical History:   Procedure Laterality Date   • APPENDECTOMY     • BREAST SURGERY      left breast lumpectomy   • CATARACT REMOVAL      both eyes   • CHOLECYSTECTOMY     • GYN       x 3, hysterectomy   • ORTHOPEDIC SURGERY      L knee scope   • ORTHOPEDIC SURGERY      left foot bunionectomy   • RECTOCELE REPAIR     • SHOULDER SURGERY     • UROLOGICAL SURGERY      BLADDER TACKING       Social History     Tobacco Use   • Smoking status: Never     Passive exposure: Never   • Smokeless tobacco: Never   Substance Use Topics   • Alcohol use: No       History reviewed. No pertinent family history.       Subjective and Objective Data     Patient's statement:  \" This happened to me before, but I thought it was normal with my older age. \"      Comments  Pleasant elderly woman lying in bed.      Patient Vitals for

## 2025-06-16 NOTE — PLAN OF CARE
Speech LAnguage Pathology EVALUATION/DISCHARGE    Patient: Jamee Wilson (79 y.o. female)  Date: 6/16/2025  Primary Diagnosis: Paresthesia [R20.2]  Paresthesia of left arm [R20.2]       Precautions:                     ASSESSMENT :  Patient presents with seemingly functional oropharyngeal swallow function. Oral structures and functions seemingly WNL. Patient independently fed herself sips of thin via straw and solid. Delayed throat clearing intermittently noted. She reports h/o reflux and taking reflux medication daily. Additionally, patient reports occasional feeling of globus sensation at baseline. Suspect delayed throat clearing and reported globus related to reported reflux. Patient remains on RA. SLP educated patient on aspiration and reflux precautions. No further acute SLP services warranted at this time. SLP will sign off.     Patient will be discharged from skilled speech-language pathology services at this time.     PLAN :  Recommendations and Planned Interventions:  Diet: Regular and thin liquids  --Medications as tolerated   --Upright all PO intake   --Oral hygiene 2-3x/day    Reflux precautions:  - Sit fully upright at 90 for meals (prefer that meals are eaten while sitting in a chair)  - Remain up for 1-hour after meals  - Consider 6-smaller meals throughout the day (eat about every 2-3 hours) instead of large meals  - Avoid acidic foods (tomato based, citrus fruits, alcohol, high-fat dairy, caffeine, fried foods)  - Trial warm, de-caffeinated beverages with meals to improve esophageal clearance  - Sleep w/ HOB elevated (30 degrees)  - Stop eating/drinking anything except water 2-hours before bedtime       Acute SLP Services: No, patient will be discharged from acute skilled speech-language pathology at this time.  Discharge Recommendations: No, additional SLP treatment not indicated at discharge     SUBJECTIVE:   Patient stated, “I do that all the time.” (referring to throat clearing).     OBJECTIVE:

## 2025-06-16 NOTE — ED PROVIDER NOTES
[facility] EMERGENCY DEPARTMENT  EMERGENCY DEPARTMENT ENCOUNTER       Pt Name: Jamee Wilson  MRN: [id number]  Birthdate 1945  Date of Evaluation: 6/16/2025  Provider: Kary Banks PA-C   PCP: Lenny Metcalf DO  Note Started: 10:06 AM 6/16/25     CHIEF COMPLAINT       Chief Complaint   Patient presents with   • Numbness     Patient ambulatory to triage with c/o left sided facial and arm numbness, patient reports she woke up feeling normal and around 6 AM is when she noticed the numbness started.         HISTORY OF PRESENT ILLNESS: 1 or more elements      History From: Patient  None     Jamee Wilson is a 79 y.o. female who presents to the ED today paresthesias.  Started around 6 AM.  The patient states that she notices over the left side of her face, arm, leg.  Currently now only experiences near the left side of her neck others have resolved.  No difficulties with speech.  No visual changes.  No chest pain or shortness of breath.  No other constitutional symptoms reported     Nursing Notes were all reviewed and agreed with or any disagreements were addressed in the HPI.     REVIEW OF SYSTEMS      Review of Systems     Positives and Pertinent negatives as per HPI.    PAST HISTORY     Past Medical History:  Past Medical History:   Diagnosis Date   • Anxiety    • Arrhythmia     palpitations   • Arthritis    • Atrial fibrillation (HCC)    • Diverticulosis    • Fibrocystic breast changes 7/11/2011   • GERD (gastroesophageal reflux disease)    • Hiatal hernia    • High cholesterol    • Hypertension    • Long term current use of anticoagulant therapy    • Migraine    • MARLEN (obstructive sleep apnea) 04-    AHI: 15 per hour   • Other ill-defined conditions(799.89)     VULVA-BURNING   • Unspecified sleep apnea     uses c-pap       Past Surgical History:  Past Surgical History:   Procedure Laterality Date   • APPENDECTOMY     • BREAST SURGERY  1985    left breast lumpectomy   • CATARACT REMOVAL

## 2025-06-16 NOTE — CARE COORDINATION
Care Management Initial Assessment       RUR: n/a  Readmission? No  1st IM letter given? Yes - 6/16/2025  1st  letter given: N/A, not [facility] connected - [facility], pt only received medication from [facility] mail order.    Initial Assessment: Chart reviewed. CM met with pt at bedside, introduced role, confirmed demographic information is up-to-date in the chart and review needs for discharge planning.     Review Full Code status with next of kin: Joel HARMAN Steve (spouse, [phone]) and Estrada Haleyskellyn (child, [phone]).    Pt voiced no concerns with transition of care plan at this time.     Pt and spouse reside in a single story home with 3 YOCASTA.     Pt informs independent with ADL without device  and active .      Family will transport at discharge.    PCP: DO DOMENIC Metcalf (seen 11/14/2024)  [facility] (Dr. Guerrero) seen 6/9/2025  [facility], Dr. Rice (seen 6/16/2025)  [facility]: Dr. Yates and NURY Hightower (seen 4/16/2025)    Pharmacy of choice: [facility] and [facility] at [address] [place] [phone] (for now medications).    No prior history of HH/SNF/IPR. Presently participating in Outpatient Physical Therapy through [facility] (s/p knee replacement).    DME at home: CPAP at night and shower stool.  no other DMEs    CM will continue to follow for discharge planning. Full assessment below:   06/16/25 0981   Service Assessment   Patient Orientation Alert and Oriented;Person;Place;Situation;Self   Cognition Alert   History Provided By Patient   Primary Caregiver Self   Support Systems Spouse/Significant Other;Children;Family Members;Friends/Neighbors   Patient's Healthcare Decision Maker is: Legal Next of Kin   PCP Verified by CM Yes   Last Visit to PCP Within last year   Prior Functional Level Independent in ADLs/IADLs   Current Functional Level Independent in ADLs/IADLs  (therapy to evaluate left side)   Can patient return to

## 2025-06-16 NOTE — ED NOTES
Verbal shift change report given to Chai (oncoming nurse) by Irma (offgoing nurse). Report included the following information Nurse Handoff Report, ED Encounter Summary, ED SBAR, Adult Overview, Intake/Output, MAR, Recent Results, and Neuro Assessment. Opportunity for questions and clarification provided.

## 2025-06-17 ENCOUNTER — APPOINTMENT (OUTPATIENT)
Facility: HOSPITAL | Age: 80
DRG: 069 | End: 2025-06-17
Payer: MEDICARE

## 2025-06-17 PROBLEM — R20.2 PARESTHESIAS: Status: ACTIVE | Noted: 2025-06-17

## 2025-06-17 PROBLEM — I65.23 BILATERAL CAROTID ARTERY STENOSIS: Status: ACTIVE | Noted: 2025-06-17

## 2025-06-17 LAB
ANION GAP SERPL CALC-SCNC: 3 MMOL/L (ref 2–12)
BUN SERPL-MCNC: 19 MG/DL (ref 6–20)
BUN/CREAT SERPL: 24 (ref 12–20)
CALCIUM SERPL-MCNC: 9.3 MG/DL (ref 8.5–10.1)
CHLORIDE SERPL-SCNC: 106 MMOL/L (ref 97–108)
CHOLEST SERPL-MCNC: 134 MG/DL
CO2 SERPL-SCNC: 30 MMOL/L (ref 21–32)
CREAT SERPL-MCNC: 0.8 MG/DL (ref 0.55–1.02)
ECHO BSA: 1.71 M2
ERYTHROCYTE [DISTWIDTH] IN BLOOD BY AUTOMATED COUNT: 14.5 % (ref 11.5–14.5)
EST. AVERAGE GLUCOSE BLD GHB EST-MCNC: 154 MG/DL
GLUCOSE SERPL-MCNC: 136 MG/DL (ref 65–100)
HBA1C MFR BLD: 7 % (ref 4–5.6)
HCT VFR BLD AUTO: 40.4 % (ref 35–47)
HDLC SERPL-MCNC: 50 MG/DL
HDLC SERPL: 2.7 (ref 0–5)
HGB BLD-MCNC: 12.7 G/DL (ref 11.5–16)
LDLC SERPL CALC-MCNC: 48 MG/DL (ref 0–100)
MAGNESIUM SERPL-MCNC: 1.9 MG/DL (ref 1.6–2.4)
MCH RBC QN AUTO: 26.1 PG (ref 26–34)
MCHC RBC AUTO-ENTMCNC: 31.4 G/DL (ref 30–36.5)
MCV RBC AUTO: 83 FL (ref 80–99)
NRBC # BLD: 0 K/UL (ref 0–0.01)
NRBC BLD-RTO: 0 PER 100 WBC
PLATELET # BLD AUTO: 231 K/UL (ref 150–400)
PMV BLD AUTO: 11.9 FL (ref 8.9–12.9)
POTASSIUM SERPL-SCNC: 3.3 MMOL/L (ref 3.5–5.1)
RBC # BLD AUTO: 4.87 M/UL (ref 3.8–5.2)
SODIUM SERPL-SCNC: 139 MMOL/L (ref 136–145)
T4 FREE SERPL-MCNC: 1.1 NG/DL (ref 0.8–1.5)
TRIGL SERPL-MCNC: 180 MG/DL
TSH SERPL DL<=0.05 MIU/L-ACNC: 1.95 UIU/ML (ref 0.36–3.74)
VAS LEFT CCA DIST EDV: 13.3 CM/S
VAS LEFT CCA DIST PSV: 69.8 CM/S
VAS LEFT CCA PROX EDV: 11.6 CM/S
VAS LEFT CCA PROX PSV: 76.2 CM/S
VAS LEFT ECA EDV: 0 CM/S
VAS LEFT ECA PSV: 77.8 CM/S
VAS LEFT ICA DIST EDV: 19.7 CM/S
VAS LEFT ICA DIST PSV: 82.7 CM/S
VAS LEFT ICA MID EDV: 14.9 CM/S
VAS LEFT ICA MID PSV: 71.4 CM/S
VAS LEFT ICA PROX EDV: 22.9 CM/S
VAS LEFT ICA PROX PSV: 81.1 CM/S
VAS LEFT ICA/CCA PSV: 1.18 NO UNITS
VAS LEFT SUBCLAVIAN PROX EDV: 0 CM/S
VAS LEFT SUBCLAVIAN PROX PSV: 137.1 CM/S
VAS LEFT VERTEBRAL EDV: 6.73 CM/S
VAS LEFT VERTEBRAL PSV: 62.4 CM/S
VAS RIGHT CCA DIST EDV: 8.4 CM/S
VAS RIGHT CCA DIST PSV: 77.9 CM/S
VAS RIGHT CCA PROX EDV: 11.6 CM/S
VAS RIGHT CCA PROX PSV: 76.3 CM/S
VAS RIGHT ECA EDV: 0 CM/S
VAS RIGHT ECA PSV: 59.5 CM/S
VAS RIGHT ICA DIST EDV: 17 CM/S
VAS RIGHT ICA DIST PSV: 66.1 CM/S
VAS RIGHT ICA MID EDV: 15.3 CM/S
VAS RIGHT ICA MID PSV: 66.9 CM/S
VAS RIGHT ICA PROX EDV: 10.1 CM/S
VAS RIGHT ICA PROX PSV: 44.2 CM/S
VAS RIGHT ICA/CCA PSV: 0.9 NO UNITS
VAS RIGHT SUBCLAVIAN PROX EDV: 0 CM/S
VAS RIGHT SUBCLAVIAN PROX PSV: 177.9 CM/S
VAS RIGHT VERTEBRAL EDV: 10.25 CM/S
VAS RIGHT VERTEBRAL PSV: 77.3 CM/S
VLDLC SERPL CALC-MCNC: 36 MG/DL
WBC # BLD AUTO: 8.7 K/UL (ref 3.6–11)

## 2025-06-17 PROCEDURE — 6370000000 HC RX 637 (ALT 250 FOR IP)

## 2025-06-17 PROCEDURE — 97161 PT EVAL LOW COMPLEX 20 MIN: CPT | Performed by: PHYSICAL THERAPIST

## 2025-06-17 PROCEDURE — 84439 ASSAY OF FREE THYROXINE: CPT

## 2025-06-17 PROCEDURE — 2500000003 HC RX 250 WO HCPCS

## 2025-06-17 PROCEDURE — 83735 ASSAY OF MAGNESIUM: CPT

## 2025-06-17 PROCEDURE — 99233 SBSQ HOSP IP/OBS HIGH 50: CPT

## 2025-06-17 PROCEDURE — 36415 COLL VENOUS BLD VENIPUNCTURE: CPT

## 2025-06-17 PROCEDURE — 6360000002 HC RX W HCPCS

## 2025-06-17 PROCEDURE — 1100000000 HC RM PRIVATE

## 2025-06-17 PROCEDURE — 93880 EXTRACRANIAL BILAT STUDY: CPT

## 2025-06-17 PROCEDURE — 97116 GAIT TRAINING THERAPY: CPT | Performed by: PHYSICAL THERAPIST

## 2025-06-17 PROCEDURE — 84443 ASSAY THYROID STIM HORMONE: CPT

## 2025-06-17 PROCEDURE — 85027 COMPLETE CBC AUTOMATED: CPT

## 2025-06-17 PROCEDURE — 80048 BASIC METABOLIC PNL TOTAL CA: CPT

## 2025-06-17 PROCEDURE — 83036 HEMOGLOBIN GLYCOSYLATED A1C: CPT

## 2025-06-17 PROCEDURE — 80061 LIPID PANEL: CPT

## 2025-06-17 PROCEDURE — 93880 EXTRACRANIAL BILAT STUDY: CPT | Performed by: PSYCHIATRY & NEUROLOGY

## 2025-06-17 RX ADMIN — ASPIRIN 81 MG: 81 TABLET, DELAYED RELEASE ORAL at 08:14

## 2025-06-17 RX ADMIN — CLOPIDOGREL BISULFATE 75 MG: 75 TABLET, FILM COATED ORAL at 08:14

## 2025-06-17 RX ADMIN — ENOXAPARIN SODIUM 40 MG: 100 INJECTION SUBCUTANEOUS at 08:15

## 2025-06-17 RX ADMIN — SODIUM CHLORIDE, PRESERVATIVE FREE 5 ML: 5 INJECTION INTRAVENOUS at 20:17

## 2025-06-17 RX ADMIN — SODIUM CHLORIDE, PRESERVATIVE FREE 10 ML: 5 INJECTION INTRAVENOUS at 08:16

## 2025-06-17 RX ADMIN — EZETIMIBE 10 MG: 10 TABLET ORAL at 08:14

## 2025-06-17 RX ADMIN — PANTOPRAZOLE SODIUM 40 MG: 40 TABLET, DELAYED RELEASE ORAL at 06:22

## 2025-06-17 RX ADMIN — ACETAMINOPHEN 325 MG: 325 TABLET ORAL at 18:22

## 2025-06-17 ASSESSMENT — PAIN SCALES - GENERAL
PAINLEVEL_OUTOF10: 0
PAINLEVEL_OUTOF10: 5
PAINLEVEL_OUTOF10: 2

## 2025-06-17 ASSESSMENT — PAIN DESCRIPTION - DESCRIPTORS: DESCRIPTORS: ACHING

## 2025-06-17 ASSESSMENT — PAIN DESCRIPTION - LOCATION: LOCATION: HEAD

## 2025-06-18 ENCOUNTER — APPOINTMENT (OUTPATIENT)
Facility: HOSPITAL | Age: 80
DRG: 069 | End: 2025-06-18
Attending: PSYCHIATRY & NEUROLOGY
Payer: MEDICARE

## 2025-06-18 VITALS
OXYGEN SATURATION: 97 % | HEART RATE: 73 BPM | TEMPERATURE: 97.7 F | DIASTOLIC BLOOD PRESSURE: 65 MMHG | RESPIRATION RATE: 18 BRPM | WEIGHT: 150 LBS | SYSTOLIC BLOOD PRESSURE: 138 MMHG | HEIGHT: 61 IN | BODY MASS INDEX: 28.32 KG/M2

## 2025-06-18 LAB
ANION GAP SERPL CALC-SCNC: 5 MMOL/L (ref 2–12)
BASOPHILS # BLD: 0.05 K/UL (ref 0–0.1)
BASOPHILS NFR BLD: 0.6 % (ref 0–1)
BUN SERPL-MCNC: 17 MG/DL (ref 6–20)
BUN/CREAT SERPL: 21 (ref 12–20)
CALCIUM SERPL-MCNC: 9.4 MG/DL (ref 8.5–10.1)
CHLORIDE SERPL-SCNC: 106 MMOL/L (ref 97–108)
CO2 SERPL-SCNC: 32 MMOL/L (ref 21–32)
CREAT SERPL-MCNC: 0.8 MG/DL (ref 0.55–1.02)
DIFFERENTIAL METHOD BLD: ABNORMAL
ECHO AO ASC DIAM: 3.5 CM
ECHO AO ASCENDING AORTA INDEX: 2.1 CM/M2
ECHO AO ROOT DIAM: 3.1 CM
ECHO AO ROOT INDEX: 1.86 CM/M2
ECHO AV AREA PEAK VELOCITY: 2.2 CM2
ECHO AV AREA PEAK VELOCITY: 2.2 CM2
ECHO AV AREA PEAK VELOCITY: 2.4 CM2
ECHO AV AREA PEAK VELOCITY: 2.4 CM2
ECHO AV AREA VTI: 2.6 CM2
ECHO AV AREA/BSA VTI: 1.6 CM2/M2
ECHO AV MEAN GRADIENT: 4 MMHG
ECHO AV MEAN VELOCITY: 0.9 M/S
ECHO AV PEAK GRADIENT: 6 MMHG
ECHO AV PEAK GRADIENT: 7 MMHG
ECHO AV PEAK VELOCITY: 1.2 M/S
ECHO AV PEAK VELOCITY: 1.4 M/S
ECHO AV VTI: 27.8 CM
ECHO BSA: 1.71 M2
ECHO LA DIAMETER INDEX: 2.34 CM/M2
ECHO LA DIAMETER: 3.9 CM
ECHO LA TO AORTIC ROOT RATIO: 1.26
ECHO LA VOL A-L A2C: 50 ML (ref 22–52)
ECHO LA VOL A-L A4C: 47 ML (ref 22–52)
ECHO LA VOL MOD A2C: 48 ML (ref 22–52)
ECHO LA VOL MOD A4C: 44 ML (ref 22–52)
ECHO LA VOLUME AREA LENGTH: 49 ML
ECHO LA VOLUME INDEX A-L A2C: 30 ML/M2 (ref 16–34)
ECHO LA VOLUME INDEX A-L A4C: 28 ML/M2 (ref 16–34)
ECHO LA VOLUME INDEX AREA LENGTH: 29 ML/M2 (ref 16–34)
ECHO LA VOLUME INDEX MOD A2C: 29 ML/M2 (ref 16–34)
ECHO LA VOLUME INDEX MOD A4C: 26 ML/M2 (ref 16–34)
ECHO LV E' LATERAL VELOCITY: 6.96 CM/S
ECHO LV E' SEPTAL VELOCITY: 5.56 CM/S
ECHO LV EF PHYSICIAN: 55 %
ECHO LV FRACTIONAL SHORTENING: 31 % (ref 28–44)
ECHO LV INTERNAL DIMENSION DIASTOLE INDEX: 2.34 CM/M2
ECHO LV INTERNAL DIMENSION DIASTOLIC: 3.9 CM (ref 3.9–5.3)
ECHO LV INTERNAL DIMENSION SYSTOLIC INDEX: 1.62 CM/M2
ECHO LV INTERNAL DIMENSION SYSTOLIC: 2.7 CM
ECHO LV IVSD: 1.1 CM (ref 0.6–0.9)
ECHO LV MASS 2D: 131 G (ref 67–162)
ECHO LV MASS INDEX 2D: 78.4 G/M2 (ref 43–95)
ECHO LV POSTERIOR WALL DIASTOLIC: 1 CM (ref 0.6–0.9)
ECHO LV RELATIVE WALL THICKNESS RATIO: 0.51
ECHO LVOT AREA: 3.1 CM2
ECHO LVOT AV VTI INDEX: 0.85
ECHO LVOT DIAM: 2 CM
ECHO LVOT MEAN GRADIENT: 2 MMHG
ECHO LVOT PEAK GRADIENT: 4 MMHG
ECHO LVOT PEAK GRADIENT: 4 MMHG
ECHO LVOT PEAK VELOCITY: 1 M/S
ECHO LVOT PEAK VELOCITY: 1 M/S
ECHO LVOT STROKE VOLUME INDEX: 44.2 ML/M2
ECHO LVOT SV: 73.8 ML
ECHO LVOT VTI: 23.5 CM
ECHO MV A VELOCITY: 1.09 M/S
ECHO MV E DECELERATION TIME (DT): 199.9 MS
ECHO MV E VELOCITY: 0.76 M/S
ECHO MV E/A RATIO: 0.7
ECHO MV E/E' LATERAL: 10.92
ECHO MV E/E' RATIO (AVERAGED): 12.29
ECHO MV E/E' SEPTAL: 13.67
ECHO RV FREE WALL PEAK S': 11.2 CM/S
ECHO RV INTERNAL DIMENSION: 2.8 CM
ECHO RV LONGITUDINAL DIMENSION: 3.9 CM
ECHO RV TAPSE: 2.5 CM (ref 1.7–?)
ECHO TV REGURGITANT MAX VELOCITY: 2.41 M/S
ECHO TV REGURGITANT PEAK GRADIENT: 23 MMHG
EOSINOPHIL # BLD: 0.68 K/UL (ref 0–0.4)
EOSINOPHIL NFR BLD: 8.6 % (ref 0–7)
ERYTHROCYTE [DISTWIDTH] IN BLOOD BY AUTOMATED COUNT: 14.6 % (ref 11.5–14.5)
GLUCOSE SERPL-MCNC: 128 MG/DL (ref 65–100)
HCT VFR BLD AUTO: 40.1 % (ref 35–47)
HGB BLD-MCNC: 12.6 G/DL (ref 11.5–16)
IMM GRANULOCYTES # BLD AUTO: 0.03 K/UL (ref 0–0.04)
IMM GRANULOCYTES NFR BLD AUTO: 0.4 % (ref 0–0.5)
LYMPHOCYTES # BLD: 2.58 K/UL (ref 0.8–3.5)
LYMPHOCYTES NFR BLD: 32.5 % (ref 12–49)
MCH RBC QN AUTO: 26.2 PG (ref 26–34)
MCHC RBC AUTO-ENTMCNC: 31.4 G/DL (ref 30–36.5)
MCV RBC AUTO: 83.4 FL (ref 80–99)
MONOCYTES # BLD: 0.74 K/UL (ref 0–1)
MONOCYTES NFR BLD: 9.3 % (ref 5–13)
NEUTS SEG # BLD: 3.85 K/UL (ref 1.8–8)
NEUTS SEG NFR BLD: 48.6 % (ref 32–75)
NRBC # BLD: 0 K/UL (ref 0–0.01)
NRBC BLD-RTO: 0 PER 100 WBC
PLATELET # BLD AUTO: 244 K/UL (ref 150–400)
PMV BLD AUTO: 11.9 FL (ref 8.9–12.9)
POTASSIUM SERPL-SCNC: 3.4 MMOL/L (ref 3.5–5.1)
RBC # BLD AUTO: 4.81 M/UL (ref 3.8–5.2)
SODIUM SERPL-SCNC: 143 MMOL/L (ref 136–145)
WBC # BLD AUTO: 7.9 K/UL (ref 3.6–11)

## 2025-06-18 PROCEDURE — 93308 TTE F-UP OR LMTD: CPT

## 2025-06-18 PROCEDURE — 6370000000 HC RX 637 (ALT 250 FOR IP): Performed by: INTERNAL MEDICINE

## 2025-06-18 PROCEDURE — 2500000003 HC RX 250 WO HCPCS

## 2025-06-18 PROCEDURE — 36415 COLL VENOUS BLD VENIPUNCTURE: CPT

## 2025-06-18 PROCEDURE — 80048 BASIC METABOLIC PNL TOTAL CA: CPT

## 2025-06-18 PROCEDURE — 6370000000 HC RX 637 (ALT 250 FOR IP)

## 2025-06-18 PROCEDURE — 85025 COMPLETE CBC W/AUTO DIFF WBC: CPT

## 2025-06-18 RX ADMIN — ASPIRIN 81 MG: 81 TABLET, DELAYED RELEASE ORAL at 07:39

## 2025-06-18 RX ADMIN — SODIUM CHLORIDE, PRESERVATIVE FREE 10 ML: 5 INJECTION INTRAVENOUS at 07:40

## 2025-06-18 RX ADMIN — APIXABAN 5 MG: 5 TABLET, FILM COATED ORAL at 07:39

## 2025-06-18 RX ADMIN — AMLODIPINE BESYLATE 5 MG: 5 TABLET ORAL at 09:35

## 2025-06-18 RX ADMIN — EZETIMIBE 10 MG: 10 TABLET ORAL at 07:39

## 2025-06-18 RX ADMIN — METOPROLOL SUCCINATE 50 MG: 50 TABLET, EXTENDED RELEASE ORAL at 09:35

## 2025-06-18 RX ADMIN — PANTOPRAZOLE SODIUM 40 MG: 40 TABLET, DELAYED RELEASE ORAL at 07:07

## 2025-06-18 ASSESSMENT — PAIN SCALES - GENERAL: PAINLEVEL_OUTOF10: 0

## 2025-06-18 NOTE — PROGRESS NOTES
1408 - NEW PATIENT PCP hospital follow-up transitional care appointment has been scheduled with NP Don Villareal on 7/28/25 1500. Pending patient discharge.     1205 - Attempted to schedule NEW PATIENT PCP hospital follow up appointment with [facility]. Unable to reach anyone, unable to leave voicemail. Patient asked for a PCP in [place]. Pending patient discharge.     Attempted to schedule NEW PATIENT PCP hospital follow up appointment with [facility]. Unable to reach anyone, unable to leave voicemail. Pending patient discharge.   
Discharge paperwork reviewed with patient. Patient verbalized understanding. Patient confirmed pharmacy and confirmed will be making follow-up appointments. All belongings sent with patient and all LDAs removed. Patient discharging to home with family to transport.     
End of Shift Note    Bedside shift change report given to  Adiel RN  (oncoming nurse) by Constanza Adam, TIAGO .        Shift worked:  1998-1972   Shift summary and any significant changes:     ECHO pending, needs 30 day cardiac monitor at discharge       Concerns for physician to address:  -   Zone phone for oncoming shift:   [phone]     Patient Information  Jamee Wilson  79 y.o.  6/16/2025  8:20 AM by Kelly Enriquez MD. Jamee Wilson was admitted from [facility]    Problem List  Patient Active Problem List    Diagnosis Date Noted    Paresthesia 06/16/2025    TIA (transient ischemic attack) 06/16/2025    Irregular heartbeat 04/20/2018    PAF (paroxysmal atrial fibrillation) (HCC) 12/13/2017    Anemia 05/20/2016    Hematuria 07/13/2015    RLQ abdominal pain 08/15/2012    Obstructive sleep apnea (adult) (pediatric) 05/18/2012    Atrial fibrillation (HCC) 04/09/2012    Essential hypertension, benign 04/09/2012    History of emotional problems 04/09/2012    Mixed hyperlipidemia 04/09/2012    Fibrocystic breast changes 07/11/2011    Arthritis     Tick bite 05/21/2011    Palpitations 05/21/2011     Past Medical History:   Diagnosis Date    Anxiety     Arrhythmia     palpitations    Arthritis     Atrial fibrillation (HCC)     Diverticulosis     Fibrocystic breast changes 7/11/2011    GERD (gastroesophageal reflux disease)     Hiatal hernia     High cholesterol     Hypertension     Long term current use of anticoagulant therapy     Migraine     MARLEN (obstructive sleep apnea) 04-    AHI: 15 per hour    Other ill-defined conditions(799.89)     VULVA-BURNING    Unspecified sleep apnea     uses c-pap       Core Measures:  CVA: yes  CHF: no  PNA: no    Activity:   Number times ambulated in hallways past shift: 0  Number of times OOB to chair past shift: 1    Cardiac:   Cardiac Monitoring: yes, nsr    Access:   Current line(s): PIV    Respiratory:   O2 Device: None (Room air)    GI:     Current diet:  ADULT DIET; Regular; 4 carb 
End of Shift Note    Bedside shift change report given to  Juan Luis RN  (oncoming nurse) by Constanza Adam RN .        Shift worked:  8713-9629   Shift summary and any significant changes:     New admission, MRI pending with no premeds needed       Concerns for physician to address:  -   Zone phone for oncoming shift:   [phone]     Patient Information  Jamee Wilson  79 y.o.  6/16/2025  8:20 AM by Kelly Enriquez MD. Jamee Wilson was admitted from [facility]    Problem List  Patient Active Problem List    Diagnosis Date Noted    Paresthesias 06/16/2025    TIA (transient ischemic attack) 06/16/2025    Irregular heartbeat 04/20/2018    PAF (paroxysmal atrial fibrillation) (HCC) 12/13/2017    Anemia 05/20/2016    Hematuria 07/13/2015    RLQ abdominal pain 08/15/2012    Obstructive sleep apnea (adult) (pediatric) 05/18/2012    Atrial fibrillation (HCC) 04/09/2012    Essential hypertension, benign 04/09/2012    History of emotional problems 04/09/2012    Mixed hyperlipidemia 04/09/2012    Fibrocystic breast changes 07/11/2011    Arthritis     Tick bite 05/21/2011    Palpitations 05/21/2011     Past Medical History:   Diagnosis Date    Anxiety     Arrhythmia     palpitations    Arthritis     Atrial fibrillation (HCC)     Diverticulosis     Fibrocystic breast changes 7/11/2011    GERD (gastroesophageal reflux disease)     Hiatal hernia     High cholesterol     Hypertension     Long term current use of anticoagulant therapy     Migraine     MARLEN (obstructive sleep apnea) 04-    AHI: 15 per hour    Other ill-defined conditions(799.89)     VULVA-BURNING    Unspecified sleep apnea     uses c-pap       Core Measures:  CVA: yes  CHF: no  PNA: no    Activity:   Number times ambulated in hallways past shift: 0  Number of times OOB to chair past shift: 1    Cardiac:   Cardiac Monitoring: yes, nsr    Access:   Current line(s): PIV    Respiratory:   O2 Device: None (Room air)    GI:     Current diet:  ADULT DIET; Regular; 4 carb choices 
HOSPITAL NEUROLOGY NOTE     Chief Complaint   Patient presents with    Numbness     Patient ambulatory to triage with c/o left sided facial and arm numbness, patient reports she woke up feeling normal and around 6 AM is when she noticed the numbness started.         HPI  Jamee Wilson is a 79 y.o. female  with a history of hypertension, dyslipidemia, atrial fibrillation, anxiety, migraine, obstructive sleep apnea on CPAP, who presented on 6/16/2025 with a chief complaint of left sided neck and mouth paresthesias that started 2 hours prior to ED arrival.  Her symptoms were accompanied by mild, generalized headache with intermittent nausea.  Given her symptoms persist after taking aspirin, patient came to the ED for evaluation.  Patient also reported left eye pain 1 day prior which has resolved.  She had experienced similar symptoms in the past on her left side a year ago but did not come to the ED for evaluation.  She denied any dizziness, room spinning sensation, visual disturbances, speech abnormality.  She denied any alcohol, tobacco, or illicit drug use.  In the ED, patient was found to have a systolic blood pressure in the 180s.  Patient was given 1 dose of Plavix 300 mg and was started on Plavix 75 mg.  She was admitted for further workup and evaluation.  Neurology was consulted and patient was seen by Dr. Herrmann.    Head CT without contrast from 6/16/2025 showed no acute abnormality.  Generalized volume loss.  Chronic microangiopathic change.    CTA head and neck from 6/16/2025 revealed distal occlusion versus near occlusion of the right callosal marginal artery by probable calcified embolus.  No suspicious intracranial mass/enhancing lesion.  Right thyroid lobe nodule and extra thyroid nodules, suggesting parathyroid adenomas.    CTP from 6/16/2025 revealed 9 cc of nonspecific perfusion defect in the right posterior cranial fossa which may represent an artifact. However, clinical correlation is advised to 
OCCUPATIONAL THERAPY EVALUATION/DISCHARGE  Patient: Jamee Wilson (79 y.o. female)  Date: 6/16/2025  Primary Diagnosis: Paresthesia [R20.2]  Paresthesia of left arm [R20.2]         Precautions: Fall Risk                  ASSESSMENT :  Pt admitted with paraesthesia of L side of face and LUE, now resolved. CT negative for acute abnormality, MRI pending.    Based on the objective data below, the patient is presenting at a grossly SBA/IND level for ADLs and functional mobility without AD. Pt tolerated evaluation well. Paraesthesia in LUE and face have resolved. BUE strength, sensation and AROM equal and intact. Fair/good standing balance throughout functional tasks, with initial mild/moderate unsteadiness, improving with time and distance, no overt LOB. Pt lives with supportive spouse and has multiple family members available to assist, PRN. Educated on BEFAST. Still awaiting MRI. Anticipate continued progress towards baseline with increased opportunities for ADL and functional mobility participation. Pt demonstrates adequate functional mobility and ADL participation to return home with available support. Further acute OT services not indicated. OT will sign off.     Functional Outcome Measure:  The patient scored 24/24 on the [facility] outcome measure.      Further skilled acute occupational therapy is not indicated at this time.     PLAN :    Recommendation for discharge: (in order for the patient to meet his/her long term goals):   No skilled occupational therapy    Other factors to consider for discharge: no additional factors    IF patient discharges home will need the following DME: none     SUBJECTIVE:   Patient stated, “He'll take care of me.” re: spouse    OBJECTIVE DATA SUMMARY:     Past Medical History:   Diagnosis Date    Anxiety     Arrhythmia     palpitations    Arthritis     Atrial fibrillation (HCC)     Diverticulosis     Fibrocystic breast changes 7/11/2011    GERD (gastroesophageal reflux disease)     
PHYSICAL THERAPY EVALUATION/DISCHARGE    Patient: Jamee Wilson (79 y.o. female)  Date: 6/17/2025  Primary Diagnosis: Paresthesia [R20.2]  Paresthesia of left arm [R20.2]       Precautions: Restrictions/Precautions  Restrictions/Precautions: Fall Risk            ASSESSMENT AND RECOMMENDATIONS:   Pt admitted with paraesthesia of L side of face and LUE, now resolved. CT negative for acute abnormality, MRI pending.     Based on the objective data below, the patient is presenting at a grossly SBA/IND level for functional mobility without AD. Pt tolerated evaluation well. Paraesthesia in LUE and face have resolved. B LE strength, sensation and AROM equal and intact. VSS BP in supine 120/53, sitting 122/60, independent with bed mobility, sit to stand with supervision /68 with no dizziness. Pt amb with SBA with no overt LOB, ascended and descended 4 steps with one rail SBA with step over step pattern Pt lives with supportive spouse and has multiple family members available to assist if needed. Educated on BEFAST. Still awaiting MRI results. Pt demonstrates adequate functional mobility to return home with available support.    Functional Outcome Measure:  The patient scored 50/56 on the [facility] outcome measure which is indicative of low falls risk.          Further skilled acute physical therapy is not indicated at this time.       PLAN :  Recommendation for discharge: (in order for the patient to meet his/her long term goals):   No skilled physical therapy    Other factors to consider for discharge: no additional factors    IF patient discharges home will need the following DME: none       SUBJECTIVE:   Patient stated “I am doing fine.”    OBJECTIVE DATA SUMMARY:     Past Medical History:   Diagnosis Date   • Anxiety    • Arrhythmia     palpitations   • Arthritis    • Atrial fibrillation (HCC)    • Diverticulosis    • Fibrocystic breast changes 7/11/2011   • GERD (gastroesophageal reflux disease)    • Hiatal hernia    • 
pressure control for the first 24 hours then Aggressive control with goal  blood pressure less than 140/90  - Dyslipidemia: LDL 48.  Goal LDL is less than 70.  Start Statins if LDL more than 70.  AST was slightly elevated at 40  - Diabetes: Hemoglobin A1c 7.0.  Patient will need aggressive glycemic control.  - Patient will need a cardiac event monitor x 30 days upon discharge.  - PT/OT/Speech  I provided stroke education today in regards to risk factors for stroke and lifestyle modifications to help minimize the risk of future stroke.  This included medication compliance, regular follow up with primary care physician,  and healthy lifestyle habits (nutrition/exercise)\"  Initial recs for aspirin and Plavix  However she does have known atrial fibrillation, I think she needs initiation of Eliquis   I discussed this with her and the   Stop Plavix  Hold cardiology consult, not sure they will change anything, have her follow-up with Dr. Peters  Echo ordered but I am not sure it is going to   Continue statin  Hopefully discharge in a.m.    Right thyroid nodule and extra thyroid nodules, suggesting parathyroid adenomas on CTA:  Outpatient follow-up with ultrasound  Normal calcium level     Obstructive sleep apnea POA  Uses CPAP     Essential hypertension  Dyslipidemia  Hold PTA amlodipine, HCTZ, metoprolol--permissive HTN as above  Continue PTA zetia  Patient with documented allergy to formulary substitutions atorvastatin and rosuvastatin.   Holding PTA pitavastatin, resume at discharge     GERD  Continue formulary substitution protonix     Body mass index is 28.34 kg/m². -  Obesity overweight POA Body mass index is 28.34 kg/m².    Code Status: Full code    DVT Prophylaxis: Eliquis in a.m.    History, assessment and plan for 6/17/2025:     Estimated discharge date:6/18/2025    Needs to be done before discharge:  Echo, initiation of Eliquis, cardiology,    Neurology wanted cardiology consult,

## 2025-06-18 NOTE — DISCHARGE INSTRUCTIONS
HOSPITALIST DISCHARGE INSTRUCTIONS    NAME: Jamee Wilson   :  1945   MRN:  [id number]     Date/Time:  2025 3:18 PM    ADMIT DATE: 2025   DISCHARGE DATE: 2025      TIA POA  Prior paroxysmal atrial fibrillation POA  Right thyroid nodule and extra thyroid nodules, suggesting parathyroid adenomas on CTA:  Obstructive sleep apnea POA  essential hypertension  Dyslipidemia  GERD    It is important that you take the medication exactly as they are prescribed.   Keep your medication in the bottles provided by the pharmacist and keep a list of the medication names, dosages, and times to be taken in your wallet.   Do not take other medications without consulting your doctor.       What to do at Home    Recommended diet:  cardiac diet    Recommended activity: activity as tolerated      If you have questions regarding the hospital related prescriptions or hospital related issues please call [facility]' office at . You can always direct your questions to your primary care doctor if you are unable to reach your hospital physician; your PCP works as an extension of your hospital doctor just like your hospital doctor is an extension of your PCP for your time at the hospital ([facility])    If you experience any of the following symptoms then please call your primary care physician or return to the emergency room if you cannot get hold of your doctor:    Fever, chills, nausea, vomiting, or persistent diarrhea  Worsening weakness or new problems with your speech or balance  Dark stools or visible blood in your stools  New Leg swelling or shortness of breath as these could be signs of a clot    Additional Instructions:      Bring these papers with you to your follow up appointments. The papers will help your doctors be sure to continue the care plan from the hospital.              Information obtained by :  I understand that if any problems occur once I am at home I am to contact my

## 2025-06-18 NOTE — CASE COMMUNICATION
COMMUNICATION NOTE - Neurology Service    Name:  Jamee Wilson     MRN: [id number]         Communication Note:   Vascular carotid ultrasound of neck is negative for Large vessel occlusion  Eliquis 5mg per oral twice daily for Afib  Stop aspirin in 10 days   I discussed with the hospitalist team and they agreed with the plan  I explained in detail about the current condition.   If TTE ECHO is positive consult cardiology and call Neurology back,  Neurology will sign off.   Rest of the management per primary team.  Please do not hesitate to call with questions or concerns.  Please let us know if we can provide any additional information.

## 2025-06-18 NOTE — DISCHARGE SUMMARY
Discharge Summary    Name: Jamee Wilson  [id number]  YOB: 1945 (Age: 79 y.o.)   Date of Admission: 6/16/2025  Date of Discharge: 6/18/2025  Attending Physician: Nii Irvin MD    Discharge Diagnosis:       Consultations:  IP CONSULT TO TELE-NEUROLOGY  IP CONSULT TO NEUROLOGY  IP CONSULT TO CASE MANAGEMENT      Brief Admission History/Reason for Admission Per Kelly Enriquez MD:     Jamee Wilson is an 79-year-old female with PMHx significant for essential hypertension, dyslipidemia, GERD and MARLEN who presented to the ED on 6/16 with complaints of left-sided neck and mouth paresthesias that started about two hours prior to arrival and resolved upon admission. Of note, she took aspirin this morning for her symptoms without relief. States she has had a mild, generalized headache and intermittent nausea since symptom onset. Endorses left eye pain one day ago, but this resolved on it's own and has not reoccurred. Patient reports similar left-sided symptoms about one year ago, at which time she did not present for evaluation. Denies dizziness, syncope, vision changes, slurred speech, chest pain, palpitations, abdominal pain, vomiting, leg swelling or dyspnea. Denies tobacco, alcohol or illicit drug use.      In the ED, patient was hypertensive with SBP in the 180s. Sodium 136, potassium 3.9, glucose 131. WBC with absolute eosinophil count of 0.55, otherwise unremarkable. CT head revealed no acute abnormality. CT brain with nonspecific perfusion in the right posterior cranial fossa. 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      We were asked to admit for further work up of the above problems.   Brief Hospital Course by Main Problems:   uspected TIA POA  Prior paroxysmal atrial fibrillation POA  I reviewed records and got the prior history of PAF  Prior atrial fibrillation, copied from

## 2025-06-18 NOTE — CARE COORDINATION
Pt is cleared for d/c from a CM standpoint.        06/18/25 0858   Services At/After Discharge   Transition of Care Consult (CM Consult) Discharge Planning   Services At/After Discharge None   Mode of Transport at Discharge Other (see comment)  (spouse)   Condition of Participation: Discharge Planning   The Plan for Transition of Care is related to the following treatment goals: home         CM acknowledged d/c order.  CM spoke with pt at bedside to discuss.  Pt shared her spouse will be transporting her home today.  Pt requested a new PCP as she does not want to return to Dr. Lawrence and her son's dr that she saw once is in [place] which is too far.  CM sent request to CM assistant. IMM notice previously provided and active.     Ratna Plummer, PENG, LCSW  Care Management, [facility]  [phone]

## 2025-06-19 ENCOUNTER — TELEPHONE (OUTPATIENT)
Age: 80
End: 2025-06-19

## 2025-06-19 NOTE — TELEPHONE ENCOUNTER
Spoke with patient, verified with 2 identifiers. Ms Wilson was told to see cardiologist for continuation on treatment. Appointment given for June 27 at 1:40 pm with max Hightower NP. Continue taking Eliquis as ordered in Hospital.    Patient voiced understanding.

## 2025-06-19 NOTE — TELEPHONE ENCOUNTER
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

## 2025-06-27 ENCOUNTER — TELEPHONE (OUTPATIENT)
Age: 80
End: 2025-06-27

## 2025-06-27 ENCOUNTER — OFFICE VISIT (OUTPATIENT)
Age: 80
End: 2025-06-27
Payer: MEDICARE

## 2025-06-27 VITALS
BODY MASS INDEX: 29.27 KG/M2 | DIASTOLIC BLOOD PRESSURE: 60 MMHG | HEIGHT: 61 IN | WEIGHT: 155 LBS | OXYGEN SATURATION: 98 % | SYSTOLIC BLOOD PRESSURE: 124 MMHG | HEART RATE: 75 BPM

## 2025-06-27 DIAGNOSIS — I10 ESSENTIAL HYPERTENSION, BENIGN: ICD-10-CM

## 2025-06-27 DIAGNOSIS — G45.9 TIA (TRANSIENT ISCHEMIC ATTACK): ICD-10-CM

## 2025-06-27 DIAGNOSIS — G45.9 TIA (TRANSIENT ISCHEMIC ATTACK): Primary | ICD-10-CM

## 2025-06-27 DIAGNOSIS — E78.2 MIXED HYPERLIPIDEMIA: ICD-10-CM

## 2025-06-27 DIAGNOSIS — I48.0 PAROXYSMAL ATRIAL FIBRILLATION (HCC): Primary | ICD-10-CM

## 2025-06-27 DIAGNOSIS — G47.33 OBSTRUCTIVE SLEEP APNEA (ADULT) (PEDIATRIC): ICD-10-CM

## 2025-06-27 PROCEDURE — 1126F AMNT PAIN NOTED NONE PRSNT: CPT

## 2025-06-27 PROCEDURE — 3078F DIAST BP <80 MM HG: CPT

## 2025-06-27 PROCEDURE — G8428 CUR MEDS NOT DOCUMENT: HCPCS

## 2025-06-27 PROCEDURE — 1111F DSCHRG MED/CURRENT MED MERGE: CPT

## 2025-06-27 PROCEDURE — 99214 OFFICE O/P EST MOD 30 MIN: CPT

## 2025-06-27 PROCEDURE — 1123F ACP DISCUSS/DSCN MKR DOCD: CPT

## 2025-06-27 PROCEDURE — G8400 PT W/DXA NO RESULTS DOC: HCPCS

## 2025-06-27 PROCEDURE — 1090F PRES/ABSN URINE INCON ASSESS: CPT

## 2025-06-27 PROCEDURE — 3074F SYST BP LT 130 MM HG: CPT

## 2025-06-27 PROCEDURE — 1036F TOBACCO NON-USER: CPT

## 2025-06-27 PROCEDURE — G8417 CALC BMI ABV UP PARAM F/U: HCPCS

## 2025-06-27 RX ORDER — ANTIARTHRITIC COMBINATION NO.2 900 MG
5000 TABLET ORAL 2 TIMES DAILY
COMMUNITY
End: 2025-06-30

## 2025-06-27 ASSESSMENT — PATIENT HEALTH QUESTIONNAIRE - PHQ9
1. LITTLE INTEREST OR PLEASURE IN DOING THINGS: NOT AT ALL
SUM OF ALL RESPONSES TO PHQ QUESTIONS 1-9: 0
2. FEELING DOWN, DEPRESSED OR HOPELESS: NOT AT ALL
SUM OF ALL RESPONSES TO PHQ QUESTIONS 1-9: 0

## 2025-06-27 NOTE — PROGRESS NOTES
Patient: Jamee Wilson  : 1945    Primary Cardiologist:Dr. TIERRA Peters  EP Cardiologist:NONE   PCP: Lenny Metcalf DO    Today's Date: 2025      ASSESSMENT AND PLAN:     Assessment and Plan:    TIA  Left lower face numbness  Head felt big   Elevated BP  Seen in ED  Results as documented in discussion below but did have probable embolus on CTA  Will refer to vascular    [place] coronary artery calcium score less than 100  Done in [place] with full body scan as well  Score 99 (86 LAD)  Discussed addressing risk factors, managing cormorbid conditions well, and diet and exercise     Paroxysmal atrial fibrillation (HCC)   Remains in sinus rhythm 2023   Normal EF mild MR per echo in    Distant history of PAF, without recurrence in a long time   previous Holter monitor in  and then event monitor May 2018, again 2024 shows no recurrence of A fib (short runs of PSVT 2024 on metoprolol, not bothersome per patient)  Continue ASA, Toprol Xl 50 mg daily  Echo 2023 LVEF 55-60%, no valvular pathology  Discussed today with recommendation to remain on eliquis moving forward with new TIA  She's on toprol  Notes some palpitations  Suggest holter monitor but they are concerned about insurance coverage so I will have it mailed  Discuss referral EP- they would like to hold off at this time    Obstructive sleep apnea (adult) (pediatric)  Per Dr. Solorzano  CPAP     Essential hypertension, benign  BP/HR look good today  Metoprolol, norvasc  Looks relatively stable on home BP log    Mixed hyperlipidemia   LDL 48 25   Continue zetia and statin therapy   Labs and lipids per PCP          Follow up with Dr. TIERRA Peters in 2 months      ICD-10-CM    1. Paroxysmal atrial fibrillation (HCC)  I48.0 Extended cardiac holter monitor (3 days - 15 days)      2. Essential hypertension, benign  I10       3. Mixed hyperlipidemia  E78.2       4. Obstructive sleep apnea (adult) (pediatric)

## 2025-06-27 NOTE — TELEPHONE ENCOUNTER
Received phone call from patient. Two identifiers used. Explained to patient that NURY Watts spoke with Dr. Busby about her CTA findings and Dr. Busby stated that patient needs an urgent referral to vascular. Patient put me on speaker and  patient's  not on HIPPA asked why would she need this. I explained to him that he was not on patient's HIPAA, patient gave me the okay to speak with him and I explained what Dr. Busby and NURY Watts stated to me. I did explain to patient and  that if she wants us to be able to talk with her  that they she needs to update her PHI. Patient was agreeable with the referral. Put in referral to vascular and patient and  requested that I can send the office address to email or mail it to them. I stated that I would try to send it email and if I can't I will mail the information. I also stated that the office will receive a fax from our office and should call her in 3-5 business days to set up an appointment. Patient asked if this would be covered by her insurance and I stated I don't know that is something she can ask the office when they call to set up an appointment. Patient verbalized understanding and has no further questions. Faxed referral to office at [phone]. Fax confirmation received.      Future Appointments   Date Time Provider Department Center   7/28/2025  3:00 PM Don Villareal APRN - NP [facility] [place]   9/22/2025 10:20 AM Cleveland Peters MD [facility]   11/19/2025 11:00 AM Kaela Solorzano MD [facility]   11/28/2025  2:00 PM Cleveland Peters MD [facility]

## 2025-06-27 NOTE — TELEPHONE ENCOUNTER
I discussed CTA findings of calcified embolus with Dr. Busby and he said that she needs urgent referral to vascular. Can you call her and let her know and put in the referral for them?    Thanks-    Mac

## 2025-06-29 LAB
EKG ATRIAL RATE: 75 BPM
EKG DIAGNOSIS: NORMAL
EKG P AXIS: 64 DEGREES
EKG P-R INTERVAL: 196 MS
EKG Q-T INTERVAL: 382 MS
EKG QRS DURATION: 80 MS
EKG QTC CALCULATION (BAZETT): 426 MS
EKG R AXIS: 39 DEGREES
EKG T AXIS: 65 DEGREES
EKG VENTRICULAR RATE: 75 BPM

## 2025-06-30 SDOH — HEALTH STABILITY: PHYSICAL HEALTH: ON AVERAGE, HOW MANY DAYS PER WEEK DO YOU ENGAGE IN MODERATE TO STRENUOUS EXERCISE (LIKE A BRISK WALK)?: 2 DAYS

## 2025-06-30 SDOH — HEALTH STABILITY: PHYSICAL HEALTH: ON AVERAGE, HOW MANY MINUTES DO YOU ENGAGE IN EXERCISE AT THIS LEVEL?: 30 MIN

## 2025-06-30 NOTE — PROGRESS NOTES
[facility] [place]  Office Note  Please note that this dictation was completed with Table[employer], the computer voice recognition software.  Quite often unanticipated grammatical, syntax, homophones, and other interpretive errors are inadvertently transcribed by the computer software.  Please disregard these errors.  Please excuse any errors that have escaped final proofreading.       History of present illness:Jamee Wilson is a 79 y.o. female presenting for New Patient (Neck pain x 3 days, Increased gas, hemorrhoids)      New patient, no recent primary care record for review.  Established with cardiologist Dr. Minor Scott for history of CAD, A-fib, obstructive sleep apnea, hypertension, mixed hyperlipidemia, type 2 diabetes.  She was recently hospitalized for suspected TIA at [facility], discharged June 18.     Type 2 diabetes  A1c 7% June 2025  No medications, diet controlled    TIA  Symptoms resolved  CTA abnormality, as discussed with cardiology      CAD, A-fib, hypertension, mixed hyperlipidemia  Managed by Dr. Hein/Cardiology  Metoprolol, Norvasc, Zetia and statin, newly on Eliquis    Obstructive sleep apnea  Compliant with CPAP, Dr. Solorzano    She would like to discuss left-sided neck pain  X 3 days, thinks may have slept on it wrong  Applying heat and ice, tylenol prn, not helpful  Rates pain 5/10   Denies numbness or tingling     External hemorrhoids  Chronic, reports irritated  She denies bleeding  Established with GI    Review of Systems   Constitutional: Negative.    Respiratory:  Negative for shortness of breath.    Cardiovascular:  Negative for chest pain and leg swelling.   Gastrointestinal:  Negative for anal bleeding.   Musculoskeletal:  Positive for neck pain.   Neurological:  Negative for dizziness.         Past Medical History:   Diagnosis Date    Anxiety     Arrhythmia     palpitations    Arthritis     Atrial fibrillation (HCC)     Diverticulosis

## 2025-07-01 ENCOUNTER — OFFICE VISIT (OUTPATIENT)
Facility: CLINIC | Age: 80
End: 2025-07-01

## 2025-07-01 ENCOUNTER — TELEPHONE (OUTPATIENT)
Age: 80
End: 2025-07-01

## 2025-07-01 VITALS
SYSTOLIC BLOOD PRESSURE: 126 MMHG | RESPIRATION RATE: 16 BRPM | DIASTOLIC BLOOD PRESSURE: 68 MMHG | OXYGEN SATURATION: 97 % | BODY MASS INDEX: 29.68 KG/M2 | HEART RATE: 68 BPM | HEIGHT: 61 IN | TEMPERATURE: 98.5 F | WEIGHT: 157.2 LBS

## 2025-07-01 DIAGNOSIS — I48.91 ATRIAL FIBRILLATION, UNSPECIFIED TYPE (HCC): ICD-10-CM

## 2025-07-01 DIAGNOSIS — I25.10 CORONARY ARTERY DISEASE INVOLVING NATIVE CORONARY ARTERY OF NATIVE HEART WITHOUT ANGINA PECTORIS: ICD-10-CM

## 2025-07-01 DIAGNOSIS — G45.9 TIA (TRANSIENT ISCHEMIC ATTACK): Primary | ICD-10-CM

## 2025-07-01 DIAGNOSIS — M54.2 NECK PAIN: ICD-10-CM

## 2025-07-01 DIAGNOSIS — E78.2 MIXED HYPERLIPIDEMIA: ICD-10-CM

## 2025-07-01 DIAGNOSIS — E11.9 TYPE 2 DIABETES MELLITUS WITHOUT COMPLICATION, WITHOUT LONG-TERM CURRENT USE OF INSULIN (HCC): ICD-10-CM

## 2025-07-01 DIAGNOSIS — K64.4 EXTERNAL HEMORRHOIDS: ICD-10-CM

## 2025-07-01 DIAGNOSIS — I10 PRIMARY HYPERTENSION: ICD-10-CM

## 2025-07-01 RX ORDER — EZETIMIBE 10 MG/1
10 TABLET ORAL DAILY
Qty: 90 TABLET | Refills: 1 | Status: SHIPPED | OUTPATIENT
Start: 2025-07-01 | End: 2025-12-28

## 2025-07-01 RX ORDER — HYDROCHLOROTHIAZIDE 25 MG/1
25 TABLET ORAL DAILY
Qty: 90 TABLET | Refills: 1 | Status: SHIPPED | OUTPATIENT
Start: 2025-07-01 | End: 2025-12-28

## 2025-07-01 RX ORDER — AMLODIPINE BESYLATE 5 MG/1
5 TABLET ORAL DAILY
Qty: 90 TABLET | Refills: 1 | Status: SHIPPED | OUTPATIENT
Start: 2025-07-01 | End: 2025-12-28

## 2025-07-01 RX ORDER — TIZANIDINE 2 MG/1
2 TABLET ORAL 3 TIMES DAILY PRN
Qty: 30 TABLET | Refills: 0 | Status: SHIPPED | OUTPATIENT
Start: 2025-07-01

## 2025-07-01 RX ORDER — PITAVASTATIN CALCIUM 4.18 MG/1
1 TABLET, FILM COATED ORAL DAILY
Qty: 90 TABLET | Refills: 1 | Status: SHIPPED | OUTPATIENT
Start: 2025-07-01 | End: 2025-12-28

## 2025-07-01 RX ORDER — HYDROCORTISONE 25 MG/G
CREAM TOPICAL
Qty: 28 G | Refills: 0 | Status: SHIPPED | OUTPATIENT
Start: 2025-07-01

## 2025-07-01 RX ORDER — HYDROCORTISONE ACETATE 25 MG/1
25 SUPPOSITORY RECTAL EVERY 12 HOURS
Qty: 12 SUPPOSITORY | Refills: 0 | Status: SHIPPED | OUTPATIENT
Start: 2025-07-01

## 2025-07-01 ASSESSMENT — ENCOUNTER SYMPTOMS
ANAL BLEEDING: 0
SHORTNESS OF BREATH: 0

## 2025-07-01 NOTE — PROGRESS NOTES
Jamee Wilson is a 79 y.o. female     Chief Complaint   Patient presents with    New Patient     Neck pain x 3 days, Increased gas, hemorrhoids       /68 (BP Site: Left Upper Arm, Patient Position: Sitting, BP Cuff Size: Medium Adult)   Pulse 68   Temp 98.5 °F (36.9 °C) (Oral)   Resp 16   Ht 1.549 m (5' 1\")   Wt 71.3 kg (157 lb 3.2 oz)   SpO2 97%   BMI 29.70 kg/m²     Health Maintenance Due   Topic Date Due    DTaP/Tdap/Td vaccine (1 - Tdap) Never done    DEXA (modify frequency per FRAX score)  Never done    Shingles vaccine (2 of 2) 11/26/2014    Respiratory Syncytial Virus (RSV) Pregnant or age 60 yrs+ (1 - 1-dose 75+ series) Never done    COVID-19 Vaccine (1 - 2024-25 season) Never done    Annual Wellness Visit (Medicare Advantage)  01/01/2025         \"Have you been to the ER, urgent care clinic since your last visit?  Hospitalized since your last visit?\"    YES - When: approximately 2  weeks ago.  [facility]    “Have you seen or consulted any other health care providers outside of [facility] since your last visit?”    NO

## 2025-07-01 NOTE — TELEPHONE ENCOUNTER
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 Peters. He recommended referral to [facility] Neurointerventional surgery for further workup. Can you let the patient know that I discussed with him and this is the recommendation.     Thanks-    Mac

## 2025-07-01 NOTE — TELEPHONE ENCOUNTER
Patient is calling because she believes she was suppose to be referred to a Vascular doctor.Please give a call back to clarify if she needs to see one or not.    [phone] patient

## 2025-07-01 NOTE — TELEPHONE ENCOUNTER
Telephone call made to patient. Two Identifiers used.  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 Peters. He recommended referral to [facility] Neurointerventional surgery for further workup. Can you let the patient know that I discussed with him and this is the recommendation.      Thanks-     Mac      Patient asked for her  to listen and for me to repeat it again. I repeated what NP Mac stated and patient's  stated that the patient was shaking her head no.Patient and patient  stated that she would like to speak with Dr. Peters personally face to face. I explained to patient that her next visit was in September and she was okay with that. Patient did state  that if it has to be done then it has to be done but she wanted to speak with Dr. Peters first. I explained that I completely understood and I will let Dr. Peters know and if she decides to move further with the referral then please let Dr. Peters know and he can help you with the referral. Patient verbalized understanding and has no further questions.    Future Appointments   Date Time Provider Department Center   7/1/2025  3:00 PM [facility] DEP   9/22/2025 10:20 AM Cleveland [facility] MD [facility]   11/19/2025 11:00 AM Kaela Solorzano MD [facility] BS [facility]   11/28/2025  2:00 PM Cleveland Peters MD [facility] BS AMB

## 2025-07-01 NOTE — TELEPHONE ENCOUNTER
Message sent to dr. Peters  Per FLORENTIN Hightower NP.  Hello FYI. This patient is coming in on September. Dr. Peters wants to refer the patient to [facility] Neuro[facility]ventional [facility] for surgery work up for her calcified embolism but patient wants to talk with Dr. Peters first before agreeing to the referral

## 2025-07-03 ENCOUNTER — PATIENT MESSAGE (OUTPATIENT)
Facility: CLINIC | Age: 80
End: 2025-07-03

## 2025-07-10 ENCOUNTER — HOSPITAL ENCOUNTER (OUTPATIENT)
Facility: HOSPITAL | Age: 80
Discharge: HOME | End: 2025-07-12
Payer: MEDICARE

## 2025-07-10 DIAGNOSIS — I48.0 PAROXYSMAL ATRIAL FIBRILLATION (HCC): ICD-10-CM

## 2025-07-10 PROCEDURE — 93246 EXT ECG>7D<15D RECORDING: CPT

## 2025-08-11 ENCOUNTER — TELEPHONE (OUTPATIENT)
Age: 80
End: 2025-08-11

## 2025-08-22 ENCOUNTER — TELEPHONE (OUTPATIENT)
Age: 80
End: 2025-08-22

## 2025-08-24 ENCOUNTER — RESULTS FOLLOW-UP (OUTPATIENT)
Age: 80
End: 2025-08-24